# Patient Record
Sex: MALE | Race: WHITE
[De-identification: names, ages, dates, MRNs, and addresses within clinical notes are randomized per-mention and may not be internally consistent; named-entity substitution may affect disease eponyms.]

---

## 2021-12-06 ENCOUNTER — HOSPITAL ENCOUNTER (INPATIENT)
Dept: HOSPITAL 95 - ER | Age: 56
LOS: 7 days | Discharge: ANOTHER HOSPITAL | DRG: 280 | End: 2021-12-13
Attending: HOSPITALIST | Admitting: HOSPITALIST
Payer: COMMERCIAL

## 2021-12-06 VITALS — WEIGHT: 190.48 LBS | HEIGHT: 75 IN | BODY MASS INDEX: 23.68 KG/M2

## 2021-12-06 DIAGNOSIS — I13.0: Primary | ICD-10-CM

## 2021-12-06 DIAGNOSIS — Z28.21: ICD-10-CM

## 2021-12-06 DIAGNOSIS — J96.01: ICD-10-CM

## 2021-12-06 DIAGNOSIS — F10.20: ICD-10-CM

## 2021-12-06 DIAGNOSIS — I27.20: ICD-10-CM

## 2021-12-06 DIAGNOSIS — K74.60: ICD-10-CM

## 2021-12-06 DIAGNOSIS — R18.8: ICD-10-CM

## 2021-12-06 DIAGNOSIS — I27.82: ICD-10-CM

## 2021-12-06 DIAGNOSIS — E83.42: ICD-10-CM

## 2021-12-06 DIAGNOSIS — I21.4: ICD-10-CM

## 2021-12-06 DIAGNOSIS — E87.6: ICD-10-CM

## 2021-12-06 DIAGNOSIS — Z20.822: ICD-10-CM

## 2021-12-06 DIAGNOSIS — R74.01: ICD-10-CM

## 2021-12-06 DIAGNOSIS — N17.9: ICD-10-CM

## 2021-12-06 DIAGNOSIS — E83.39: ICD-10-CM

## 2021-12-06 DIAGNOSIS — N18.30: ICD-10-CM

## 2021-12-06 DIAGNOSIS — I50.21: ICD-10-CM

## 2021-12-06 LAB
ALBUMIN SERPL BCP-MCNC: 3.2 G/DL (ref 3.4–5)
ALBUMIN/GLOB SERPL: 0.7 {RATIO} (ref 0.8–1.8)
ALT SERPL W P-5'-P-CCNC: 816 U/L (ref 12–78)
ANION GAP SERPL CALCULATED.4IONS-SCNC: 16 MMOL/L (ref 6–16)
BASOPHILS # BLD AUTO: 0.02 K/MM3 (ref 0–0.23)
BASOPHILS NFR BLD AUTO: 0 % (ref 0–2)
BILIRUB SERPL-MCNC: 3.7 MG/DL (ref 0.1–1)
BUN SERPL-MCNC: 41 MG/DL (ref 8–24)
CALCIUM SERPL-MCNC: 8.9 MG/DL (ref 8.5–10.1)
CHLORIDE SERPL-SCNC: 97 MMOL/L (ref 98–108)
CO2 SERPL-SCNC: 17 MMOL/L (ref 21–32)
CREAT SERPL-MCNC: 1.65 MG/DL (ref 0.6–1.2)
DEPRECATED RDW RBC AUTO: 47.3 FL (ref 35.1–46.3)
EOSINOPHIL # BLD AUTO: 0 K/MM3 (ref 0–0.68)
EOSINOPHIL NFR BLD AUTO: 0 % (ref 0–6)
ERYTHROCYTE [DISTWIDTH] IN BLOOD BY AUTOMATED COUNT: 13.6 % (ref 11.7–14.2)
GLOBULIN SER CALC-MCNC: 4.3 G/DL (ref 2.2–4)
GLUCOSE SERPL-MCNC: 158 MG/DL (ref 70–99)
HCT VFR BLD AUTO: 48 % (ref 37–53)
HGB BLD-MCNC: 15.9 G/DL (ref 13.5–17.5)
IMM GRANULOCYTES # BLD AUTO: 0.08 K/MM3 (ref 0–0.1)
IMM GRANULOCYTES NFR BLD AUTO: 1 % (ref 0–1)
LYMPHOCYTES # BLD AUTO: 0.94 K/MM3 (ref 0.84–5.2)
LYMPHOCYTES NFR BLD AUTO: 8 % (ref 21–46)
MCHC RBC AUTO-ENTMCNC: 33.1 G/DL (ref 31.5–36.5)
MCV RBC AUTO: 95 FL (ref 80–100)
MONOCYTES # BLD AUTO: 1.17 K/MM3 (ref 0.16–1.47)
MONOCYTES NFR BLD AUTO: 10 % (ref 4–13)
NEUTROPHILS # BLD AUTO: 9.91 K/MM3 (ref 1.96–9.15)
NEUTROPHILS NFR BLD AUTO: 82 % (ref 41–73)
NRBC # BLD AUTO: 0 K/MM3 (ref 0–0.02)
NRBC BLD AUTO-RTO: 0 /100 WBC (ref 0–0.2)
PLATELET # BLD AUTO: 359 K/MM3 (ref 150–400)
POTASSIUM SERPL-SCNC: 4.9 MMOL/L (ref 3.5–5.5)
PROT SERPL-MCNC: 7.5 G/DL (ref 6.4–8.2)
SODIUM SERPL-SCNC: 130 MMOL/L (ref 136–145)
TROPONIN I SERPL-MCNC: 0.26 NG/ML (ref 0–0.04)

## 2021-12-06 PROCEDURE — C1769 GUIDE WIRE: HCPCS

## 2021-12-06 PROCEDURE — C1894 INTRO/SHEATH, NON-LASER: HCPCS

## 2021-12-06 PROCEDURE — A9270 NON-COVERED ITEM OR SERVICE: HCPCS

## 2021-12-07 LAB
ALBUMIN SERPL BCP-MCNC: 3.2 G/DL (ref 3.4–5)
ALBUMIN/GLOB SERPL: 0.8 {RATIO} (ref 0.8–1.8)
ALT SERPL W P-5'-P-CCNC: 1245 U/L (ref 12–78)
AMPHETAMINES UR SCN-MCNC: DETECTED NG/ML
AMPHETAMINES UR-MCNC: DETECTED UG/L
ANION GAP SERPL CALCULATED.4IONS-SCNC: 13 MMOL/L (ref 6–16)
AST SERPL W P-5'-P-CCNC: 1712 U/L (ref 12–37)
BASOPHILS # BLD AUTO: 0.02 K/MM3 (ref 0–0.23)
BASOPHILS NFR BLD AUTO: 0 % (ref 0–2)
BILIRUB SERPL-MCNC: 3.3 MG/DL (ref 0.1–1)
BUN SERPL-MCNC: 43 MG/DL (ref 8–24)
CALCIUM SERPL-MCNC: 9.1 MG/DL (ref 8.5–10.1)
CHLORIDE SERPL-SCNC: 98 MMOL/L (ref 98–108)
CK MB CFR SERPL CALC: 1.6 % (ref 0–4)
CK MB CFR SERPL CALC: 1.9 % (ref 0–4)
CK SERPL-CCNC: 322 U/L (ref 39–308)
CK SERPL-CCNC: 402 U/L (ref 39–308)
CO2 SERPL-SCNC: 20 MMOL/L (ref 21–32)
CREAT SERPL-MCNC: 1.55 MG/DL (ref 0.6–1.2)
DEPRECATED RDW RBC AUTO: 44.7 FL (ref 35.1–46.3)
EOSINOPHIL # BLD AUTO: 0 K/MM3 (ref 0–0.68)
EOSINOPHIL NFR BLD AUTO: 0 % (ref 0–6)
ERYTHROCYTE [DISTWIDTH] IN BLOOD BY AUTOMATED COUNT: 13.4 % (ref 11.7–14.2)
FLUAV RNA SPEC QL NAA+PROBE: NEGATIVE
FLUBV RNA SPEC QL NAA+PROBE: NEGATIVE
GLOBULIN SER CALC-MCNC: 4.1 G/DL (ref 2.2–4)
GLUCOSE SERPL-MCNC: 167 MG/DL (ref 70–99)
HCT VFR BLD AUTO: 46 % (ref 37–53)
HGB BLD-MCNC: 15.7 G/DL (ref 13.5–17.5)
IMM GRANULOCYTES # BLD AUTO: 0.08 K/MM3 (ref 0–0.1)
IMM GRANULOCYTES NFR BLD AUTO: 1 % (ref 0–1)
LYMPHOCYTES # BLD AUTO: 0.91 K/MM3 (ref 0.84–5.2)
LYMPHOCYTES NFR BLD AUTO: 7 % (ref 21–46)
MCHC RBC AUTO-ENTMCNC: 34.1 G/DL (ref 31.5–36.5)
MCV RBC AUTO: 92 FL (ref 80–100)
MONOCYTES # BLD AUTO: 0.87 K/MM3 (ref 0.16–1.47)
MONOCYTES NFR BLD AUTO: 7 % (ref 4–13)
NEUTROPHILS # BLD AUTO: 11.13 K/MM3 (ref 1.96–9.15)
NEUTROPHILS NFR BLD AUTO: 86 % (ref 41–73)
NRBC # BLD AUTO: 0.02 K/MM3 (ref 0–0.02)
NRBC BLD AUTO-RTO: 0.2 /100 WBC (ref 0–0.2)
PLATELET # BLD AUTO: 366 K/MM3 (ref 150–400)
POTASSIUM SERPL-SCNC: 5 MMOL/L (ref 3.5–5.5)
PROT SERPL-MCNC: 7.3 G/DL (ref 6.4–8.2)
RSV RNA SPEC QL NAA+PROBE: NEGATIVE
SARS-COV-2 RNA RESP QL NAA+PROBE: NEGATIVE
SODIUM SERPL-SCNC: 131 MMOL/L (ref 136–145)
TROPONIN I SERPL-MCNC: 0.22 NG/ML (ref 0–0.04)
TROPONIN I SERPL-MCNC: 0.23 NG/ML (ref 0–0.04)

## 2021-12-07 NOTE — NUR
ADMIT NOTE
RECEIVED REPORT FROM MICHAEL OSEI IN ED. PT TO ROOM AT 1444 VIA GURNEY, 1 PERSON
ASSIST TRANSFERED TO BED. PT ORIENTED TO ROOM AND CALL LIGHT. EDCUATED ON FALL
RISK AND BED ALARM. PT ALERT, BUT FALLS ASLEEP QUICKLY; ORIENTED x4; CALM AND
COOPERATIVE WITH CARE. PT DENIES RECREATIONAL DRUG USE IN THE LAST 12 MONTHS.
PT DENIES PAIN, CHEST PAIN, SOB, NAUSEA AND DIZZINESS AT THIS TIME. PT STATES
PRIOR TO BEING ADMITTED HIS SOB HAS BEEN WORSENING OVER THE LAST 2-3 WEEKS. PT
RECEIVING IV LASIX AND STARTED ON METOPROLOL. PT ON HEPARIN GTT. DR LAST AT
BEDSIDE THIS EVENING TO SEE PATIENT. VSS. NO OTHER ACUTE CHANGES NOTED. WILL
CONTINUE TO MONITOR UNITL REPORT GIVEN TO ONCOMING RN.

## 2021-12-08 LAB
ALBUMIN SERPL BCP-MCNC: 2.8 G/DL (ref 3.4–5)
ANION GAP SERPL CALCULATED.4IONS-SCNC: 11 MMOL/L (ref 6–16)
BASOPHILS # BLD AUTO: 0.02 K/MM3 (ref 0–0.23)
BASOPHILS NFR BLD AUTO: 0 % (ref 0–2)
BUN SERPL-MCNC: 39 MG/DL (ref 8–24)
CALCIUM SERPL-MCNC: 8.5 MG/DL (ref 8.5–10.1)
CHLORIDE SERPL-SCNC: 95 MMOL/L (ref 98–108)
CO2 SERPL-SCNC: 27 MMOL/L (ref 21–32)
CREAT SERPL-MCNC: 1.41 MG/DL (ref 0.6–1.2)
DEPRECATED RDW RBC AUTO: 44 FL (ref 35.1–46.3)
EOSINOPHIL # BLD AUTO: 0.02 K/MM3 (ref 0–0.68)
EOSINOPHIL NFR BLD AUTO: 0 % (ref 0–6)
ERYTHROCYTE [DISTWIDTH] IN BLOOD BY AUTOMATED COUNT: 13.6 % (ref 11.7–14.2)
GLUCOSE SERPL-MCNC: 128 MG/DL (ref 70–99)
HCT VFR BLD AUTO: 44 % (ref 37–53)
HGB BLD-MCNC: 15.2 G/DL (ref 13.5–17.5)
IMM GRANULOCYTES # BLD AUTO: 0.08 K/MM3 (ref 0–0.1)
IMM GRANULOCYTES NFR BLD AUTO: 1 % (ref 0–1)
LYMPHOCYTES # BLD AUTO: 1.26 K/MM3 (ref 0.84–5.2)
LYMPHOCYTES NFR BLD AUTO: 9 % (ref 21–46)
MAGNESIUM SERPL-MCNC: 1.9 MG/DL (ref 1.6–2.4)
MCHC RBC AUTO-ENTMCNC: 34.5 G/DL (ref 31.5–36.5)
MCV RBC AUTO: 92 FL (ref 80–100)
MONOCYTES # BLD AUTO: 0.88 K/MM3 (ref 0.16–1.47)
MONOCYTES NFR BLD AUTO: 6 % (ref 4–13)
NEUTROPHILS # BLD AUTO: 11.63 K/MM3 (ref 1.96–9.15)
NEUTROPHILS NFR BLD AUTO: 84 % (ref 41–73)
NRBC # BLD AUTO: 0.06 K/MM3 (ref 0–0.02)
NRBC BLD AUTO-RTO: 0.4 /100 WBC (ref 0–0.2)
PHOSPHATE SERPL-MCNC: 3.1 MG/DL (ref 2.5–4.9)
PLATELET # BLD AUTO: 341 K/MM3 (ref 150–400)
POTASSIUM SERPL-SCNC: 3.5 MMOL/L (ref 3.5–5.5)
SODIUM SERPL-SCNC: 133 MMOL/L (ref 136–145)

## 2021-12-08 NOTE — NUR
SHIFT SUMMARY
PT A&Ox4; CALM AND COOPERATIVE WITH CARE. PT RESTING IN BED DURING SHIFT. PT
DENIES PAIN, CHEST PAIN, SOB, NASUEA AND DIZZINESS T/O SHIFT. PT RECEIVING IV
LASIX. ELEVATED BP THIS AM, INCREASED DOSE OF METORPOLOL PER ORDERS, BP
TRENDING DOWN. OTHER VSS. PLANS FOR NPO AT [facility] FOR POSSIBLE ANGIO TOMORROW. NO
OTHER ACUTE CHANGES NOTED. WILL CONTINUE TO MONITOR UNTIL REPORT GIVEN TO
ONCOMING RN.

## 2021-12-08 NOTE — NUR
LEFT AC IV INFUSING HEPARIN PER ORDERS AT 18UNITS/KG/HR, 33.8ML/HR. PER REPORT
HEPARIN DOSE HAS NOT CHANGED SINCE ADMITTANCE.

## 2021-12-08 NOTE — NUR
SHIFT SUMMARY
 
PT A&OX4, PLEASANT AND COOPERATIVE. SP02>92% ON 2L NC. PT DENIES SOB.
TELEMETYR READS SINUS TACH, 100'S. DENIED CP/PRESSURE. PT USED URINAL TO VOID
DARK YELLOW URINE.  NO BM THIS SHIFT. HEPARIN INFUSED THIS SHIFT. PT SLEPT
MOST OF NIGHT. PT HAS BEEN NPO SINCE MIDNIGHT FOR POSSIBLE PROCEDURE. CALL
LIGHT IN REACH. WILL GIVE REPORT TO ONCOMING NURSE.

## 2021-12-09 LAB
ALBUMIN SERPL BCP-MCNC: 2.6 G/DL (ref 3.4–5)
ANION GAP SERPL CALCULATED.4IONS-SCNC: 11 MMOL/L (ref 6–16)
BASOPHILS # BLD AUTO: 0.01 K/MM3 (ref 0–0.23)
BASOPHILS NFR BLD AUTO: 0 % (ref 0–2)
BUN SERPL-MCNC: 33 MG/DL (ref 8–24)
CALCIUM SERPL-MCNC: 8.1 MG/DL (ref 8.5–10.1)
CHLORIDE SERPL-SCNC: 92 MMOL/L (ref 98–108)
CO2 SERPL-SCNC: 30 MMOL/L (ref 21–32)
CREAT SERPL-MCNC: 1.29 MG/DL (ref 0.6–1.2)
DEPRECATED RDW RBC AUTO: 45.1 FL (ref 35.1–46.3)
EOSINOPHIL # BLD AUTO: 0.04 K/MM3 (ref 0–0.68)
EOSINOPHIL NFR BLD AUTO: 0 % (ref 0–6)
ERYTHROCYTE [DISTWIDTH] IN BLOOD BY AUTOMATED COUNT: 13.7 % (ref 11.7–14.2)
GLUCOSE SERPL-MCNC: 139 MG/DL (ref 70–99)
HCT VFR BLD AUTO: 45.8 % (ref 37–53)
HGB BLD-MCNC: 15.7 G/DL (ref 13.5–17.5)
IMM GRANULOCYTES # BLD AUTO: 0.05 K/MM3 (ref 0–0.1)
IMM GRANULOCYTES NFR BLD AUTO: 0 % (ref 0–1)
LYMPHOCYTES # BLD AUTO: 1.35 K/MM3 (ref 0.84–5.2)
LYMPHOCYTES NFR BLD AUTO: 11 % (ref 21–46)
MAGNESIUM SERPL-MCNC: 1.7 MG/DL (ref 1.6–2.4)
MCHC RBC AUTO-ENTMCNC: 34.3 G/DL (ref 31.5–36.5)
MCV RBC AUTO: 92 FL (ref 80–100)
MONOCYTES # BLD AUTO: 0.98 K/MM3 (ref 0.16–1.47)
MONOCYTES NFR BLD AUTO: 8 % (ref 4–13)
NEUTROPHILS # BLD AUTO: 9.64 K/MM3 (ref 1.96–9.15)
NEUTROPHILS NFR BLD AUTO: 80 % (ref 41–73)
NRBC # BLD AUTO: 0.02 K/MM3 (ref 0–0.02)
NRBC BLD AUTO-RTO: 0.2 /100 WBC (ref 0–0.2)
PHOSPHATE SERPL-MCNC: 2.3 MG/DL (ref 2.5–4.9)
PLATELET # BLD AUTO: 321 K/MM3 (ref 150–400)
POTASSIUM SERPL-SCNC: 3.3 MMOL/L (ref 3.5–5.5)
SODIUM SERPL-SCNC: 133 MMOL/L (ref 136–145)

## 2021-12-09 NOTE — NUR
CARE NOTE
PT WAS REPORTING FEELING OF DISTENTION IN ABD AND STATED THAT "STOMACH FELT
UPSET," PT ALSO REPORTED FEELINGS OF NAUSEA. VSS, MEDICATED PER EMAR FOR
NAUSEA. PT NOW APPEARS TO BE SLEEPING. WILL CONTINUE TO MONITOR.

## 2021-12-09 NOTE — NUR
SHIFT SUMMARY
PT ALERT AND ORIENTED X 4. HEPARIN INFUSING AT 18UNITS/KG/HR PER ORDERS. SPO2
REMAINS IN 90'S VIA 1LNC, VITAL SIGNS STABLE. PT DENIED CHEST PAIN/PRESSURE
DURING SHIFT BUT REPORTED ABD PAIN AND NAUSEA. NAUSEA WAS RESOLVED AFTER
ZOFRAN ADMINISTRATION PER EMAR AND PT REPORTS BELIEVING ABD PAIN IS DUE TO
CONSTIPATION, WILL REPORT THIS TO ONCOMING NURSE FOR REQUEST FOR BOWEL CARE
ORDERS. PT HAS BEEN NPO SINCE MIDNIGHT AND CONTINUES TO UTILIZE BEDSIDE
URINAL. CALL LIGHT IN REACH, WILL CONTINUE TO MONITOR.

## 2021-12-09 NOTE — NUR
PT REMAINS A&OX4. AM BP ELEVATED- SCHEDULED BP MEDS ADMINISTERED, REMAINED WNL
SINCE ADMINISTRATION. AFEBRILE. NO C/O PAIN. AUO. NO BM- C/O CONSTIPATION,
ENCOURAGED FREQUENT AMBULATION. TOLERATING CURRENT DIET. HEPARIN GTT DC'D,
TRANSITION TO LOVENOX TONIGHT- NO S/S OF BLEEDING NOTED AT THIS TIME. ANGIO
HELD PER CARDIO- NO RESCHEDULE DATE PROVIDED. BLE VENOUS DUPLEX COMPLETED- SEE
RESULTS. FREQUENT ROUNDS TO ENSURE PT SAFETY. PT IN NO APPARENT DISTRESS AT
THIS TIME. WILL CONTINUE TO MONITOR UNTIL TRANSFER OF CARE TO ONCOMING RN.

## 2021-12-10 LAB
ALBUMIN SERPL BCP-MCNC: 2.7 G/DL (ref 3.4–5)
ANION GAP SERPL CALCULATED.4IONS-SCNC: 10 MMOL/L (ref 6–16)
BUN SERPL-MCNC: 29 MG/DL (ref 8–24)
CALCIUM SERPL-MCNC: 8.4 MG/DL (ref 8.5–10.1)
CHLORIDE SERPL-SCNC: 90 MMOL/L (ref 98–108)
CO2 SERPL-SCNC: 32 MMOL/L (ref 21–32)
CREAT SERPL-MCNC: 1.42 MG/DL (ref 0.6–1.2)
GLUCOSE SERPL-MCNC: 133 MG/DL (ref 70–99)
HEP C VIRUS AB: <0.1 (ref 0–0.9)
MAGNESIUM SERPL-MCNC: 1.9 MG/DL (ref 1.6–2.4)
PHOSPHATE SERPL-MCNC: 2.9 MG/DL (ref 2.5–4.9)
POTASSIUM SERPL-SCNC: 3.1 MMOL/L (ref 3.5–5.5)
SODIUM SERPL-SCNC: 132 MMOL/L (ref 136–145)

## 2021-12-10 NOTE — NUR
PT REMAINS A&OX4. AM BP ELEVATED- SCHEDULED BP MEDS ADMINISTERED, BP REMAINED
WNL SINCE ADMINISTRATION. AUO. NO BM- C/O CONSTIPATION, FREQUENT AMBULATION
AND ORAL INTAKE ENCOURAGE. TOLERATING CURRENT DIET.
 
TENTATIVE ANGIO SCHEDULED TOMORROW (12/11), WILL NEED TO BE NPO AT 00:00 PER
CARDIO. K REPLACED.
 
FREQUENT ROUNDS TO ENSURE PT SAFETY. PT IN NO APPARENT DISTRESS AT THIS TIME.
WILL CONTINUE TO MONITOR UNTIL TRANSFER OF CARE TO ONCOMING RN.

## 2021-12-10 NOTE — NUR
EVENT NOTE
PT REQUESTED RN TO ROOM WITH COMPLAINT OF ITCHING AND TINGLING TO RIGHT HAND.
IV SITE IS PATENT AND RUNNING LEVAQUIN AND LR TO R HAND. NO SWELLING OR
REDNESS BUT PT IS VISIBLY SCRATCHING AND WIGGLING FINGERS. PLACED LEVAQUIN ON
STAND-BY SINCE THIS IS A NEW MEDICATION AND NOTIFIED PRIMARY RN SAROJ OF NEED
TO NOTIFY MD. ADVISED PT TO CALL AGAIN IF FURTHER CONCERNS OR CHANGES.

## 2021-12-10 NOTE — NUR
PT HAS BEEN SLEEPING THROUGHOUT SHIFT. BP SLIGHTLY ELEVATED BUT NOT WITHIN
PARAMETERS FOR PRN HYDRALAZINE. DIURESING. DENIES SOB OR CHEST PAIN. EDUCATED
PATIENT ON DIAGNOSIS AND TREATMENT PLAN. CONTINUE EDUCATION AS DISEASE
PROCESSES ARE NEW TO PATIENT.

## 2021-12-11 LAB
ALBUMIN SERPL BCP-MCNC: 2.6 G/DL (ref 3.4–5)
ANION GAP SERPL CALCULATED.4IONS-SCNC: 10 MMOL/L (ref 6–16)
BUN SERPL-MCNC: 28 MG/DL (ref 8–24)
CALCIUM SERPL-MCNC: 8.8 MG/DL (ref 8.5–10.1)
CHLORIDE SERPL-SCNC: 88 MMOL/L (ref 98–108)
CO2 SERPL-SCNC: 32 MMOL/L (ref 21–32)
CREAT SERPL-MCNC: 1.45 MG/DL (ref 0.6–1.2)
GLUCOSE SERPL-MCNC: 139 MG/DL (ref 70–99)
MAGNESIUM SERPL-MCNC: 1.9 MG/DL (ref 1.6–2.4)
PHOSPHATE SERPL-MCNC: 2.8 MG/DL (ref 2.5–4.9)
POTASSIUM SERPL-SCNC: 3.6 MMOL/L (ref 3.5–5.5)
SODIUM SERPL-SCNC: 130 MMOL/L (ref 136–145)

## 2021-12-11 PROCEDURE — 4A023N7 MEASUREMENT OF CARDIAC SAMPLING AND PRESSURE, LEFT HEART, PERCUTANEOUS APPROACH: ICD-10-PCS | Performed by: INTERNAL MEDICINE

## 2021-12-11 PROCEDURE — B2111ZZ FLUOROSCOPY OF MULTIPLE CORONARY ARTERIES USING LOW OSMOLAR CONTRAST: ICD-10-PCS | Performed by: INTERNAL MEDICINE

## 2021-12-11 NOTE — NUR
PT A&OX4. VSS. NO C/O PAIN. AUO. TOLERATING CURRENT DIET.
 
ANGIO COMPLETE- PENDING PROVIDENCE EVAL PER CARDIO. IVF STARTED.
 
FREQUENT ROUNDS TO ENSURE PT SAFETY. PT IN NO APPARENT DISTRESS AT THIS TIME.
WILL CONTINUE TO MONITOR UNTIL TRANSFER OF CARE TO ONCOMING RN.

## 2021-12-11 NOTE — NUR
PT REPORTING CONSITPATION AT STRT OF SHIFT. ADMINISERED STOOL SOFTNER, PT
TAKES AHOWER AND IS ABLE TO HAVE BM. CONTINUE BOWEL REGIMEN.

## 2021-12-12 LAB
ALBUMIN SERPL BCP-MCNC: 2.4 G/DL (ref 3.4–5)
ALBUMIN/GLOB SERPL: 0.7 {RATIO} (ref 0.8–1.8)
ALT SERPL W P-5'-P-CCNC: 270 U/L (ref 12–78)
ANION GAP SERPL CALCULATED.4IONS-SCNC: 9 MMOL/L (ref 6–16)
AST SERPL W P-5'-P-CCNC: 95 U/L (ref 12–37)
BILIRUB SERPL-MCNC: 1.3 MG/DL (ref 0.1–1)
BUN SERPL-MCNC: 49 MG/DL (ref 8–24)
CALCIUM SERPL-MCNC: 8.2 MG/DL (ref 8.5–10.1)
CHLORIDE SERPL-SCNC: 97 MMOL/L (ref 98–108)
CO2 SERPL-SCNC: 26 MMOL/L (ref 21–32)
CREAT SERPL-MCNC: 1.2 MG/DL (ref 0.6–1.2)
GLOBULIN SER CALC-MCNC: 3.6 G/DL (ref 2.2–4)
GLUCOSE SERPL-MCNC: 115 MG/DL (ref 70–99)
POTASSIUM SERPL-SCNC: 5 MMOL/L (ref 3.5–5.5)
PROT SERPL-MCNC: 6 G/DL (ref 6.4–8.2)
SODIUM SERPL-SCNC: 132 MMOL/L (ref 136–145)

## 2021-12-12 NOTE — NUR
NO SIGNIFICANT EVENTS THIS SHIFT. PT RECIEVED 1L NS, URINE IS [place] COLORED.
VSS, NO ABNORMAL CARDIAC RHYTHMS THIS SHIFT. PLACED RIGHT WRIST IN SPLINT AS
PATIENT WAS OVER USING IT RESULTING IN SLIGHT OOZING FROM RADIAL SITE.
STABALIZED WITH SPLINT PLACEMENT.

## 2021-12-12 NOTE — NUR
END OF SHIFT SUMMARY:
     PATIENT HAS BEEN AWAITING [facility]BRA TRANSFER TO [facility] FOR
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 PATIENT
HAS BEEN ALERT AND ORIENTED, RESTING MOST OF THE DAY. I HELD HIS 1400
HYDRALAZINE, DUE TO LOWER BLOOD PRESSURE OF 90'S OVER 60'S AFTER TYPICALLTY
BEING IN 'S. NO PRN HYDRALAZINE USED THIS SHIFT, PATIENT DENIES CHEST
PAIN, RESTING MOST OF THE DAY. AWAITING [place] FOR BED PLACEMENT.

## 2021-12-13 LAB
ALBUMIN SERPL BCP-MCNC: 2.3 G/DL (ref 3.4–5)
ALBUMIN/GLOB SERPL: 0.6 {RATIO} (ref 0.8–1.8)
ALT SERPL W P-5'-P-CCNC: 196 U/L (ref 12–78)
ANION GAP SERPL CALCULATED.4IONS-SCNC: 9 MMOL/L (ref 6–16)
AST SERPL W P-5'-P-CCNC: 60 U/L (ref 12–37)
BILIRUB SERPL-MCNC: 1.1 MG/DL (ref 0.1–1)
BUN SERPL-MCNC: 59 MG/DL (ref 8–24)
CALCIUM SERPL-MCNC: 8.1 MG/DL (ref 8.5–10.1)
CHLORIDE SERPL-SCNC: 97 MMOL/L (ref 98–108)
CO2 SERPL-SCNC: 25 MMOL/L (ref 21–32)
CREAT SERPL-MCNC: 1.18 MG/DL (ref 0.6–1.2)
FLUAV RNA SPEC QL NAA+PROBE: NEGATIVE
FLUBV RNA SPEC QL NAA+PROBE: NEGATIVE
GLOBULIN SER CALC-MCNC: 3.8 G/DL (ref 2.2–4)
GLUCOSE SERPL-MCNC: 130 MG/DL (ref 70–99)
POTASSIUM SERPL-SCNC: 4.5 MMOL/L (ref 3.5–5.5)
PROT SERPL-MCNC: 6.1 G/DL (ref 6.4–8.2)
RSV RNA SPEC QL NAA+PROBE: NEGATIVE
SARS-COV-2 RNA RESP QL NAA+PROBE: NEGATIVE
SODIUM SERPL-SCNC: 131 MMOL/L (ref 136–145)

## 2021-12-13 NOTE — NUR
NIGHT SHIFT SUMMARY
PT IS AXO X4. PT DENIED ANY CP OR PRESSURE THIS SHIFT. PT MAINTAINED O2 SATS
>92% ON EM AIR THIS SHIFT. BP WNL AND STABLE THIS SHIFT. PT SLEPT COMFORTABLY
IN BED FOR MOST OF THE SHIFT. PT NPO SINCE 2330 PER ORDER. WILL REPORT TO
ONCOMING RN.

## 2022-01-27 ENCOUNTER — HOSPITAL ENCOUNTER (INPATIENT)
Dept: HOSPITAL 95 - ER | Age: 57
LOS: 10 days | Discharge: HOME | DRG: 291 | End: 2022-02-06
Attending: FAMILY MEDICINE | Admitting: HOSPITALIST
Payer: COMMERCIAL

## 2022-01-27 VITALS — HEIGHT: 75 IN | BODY MASS INDEX: 26.18 KG/M2 | WEIGHT: 210.54 LBS

## 2022-01-27 DIAGNOSIS — E87.1: ICD-10-CM

## 2022-01-27 DIAGNOSIS — Z91.14: ICD-10-CM

## 2022-01-27 DIAGNOSIS — I48.19: ICD-10-CM

## 2022-01-27 DIAGNOSIS — F15.10: ICD-10-CM

## 2022-01-27 DIAGNOSIS — N18.30: ICD-10-CM

## 2022-01-27 DIAGNOSIS — J96.21: ICD-10-CM

## 2022-01-27 DIAGNOSIS — K74.60: ICD-10-CM

## 2022-01-27 DIAGNOSIS — Z20.822: ICD-10-CM

## 2022-01-27 DIAGNOSIS — I25.2: ICD-10-CM

## 2022-01-27 DIAGNOSIS — Z86.711: ICD-10-CM

## 2022-01-27 DIAGNOSIS — G47.00: ICD-10-CM

## 2022-01-27 DIAGNOSIS — Z87.891: ICD-10-CM

## 2022-01-27 DIAGNOSIS — I13.0: Primary | ICD-10-CM

## 2022-01-27 DIAGNOSIS — Z79.899: ICD-10-CM

## 2022-01-27 DIAGNOSIS — I48.92: ICD-10-CM

## 2022-01-27 DIAGNOSIS — I50.23: ICD-10-CM

## 2022-01-27 DIAGNOSIS — I42.7: ICD-10-CM

## 2022-01-27 DIAGNOSIS — N17.9: ICD-10-CM

## 2022-01-27 LAB
ALBUMIN SERPL BCP-MCNC: 2.8 G/DL (ref 3.4–5)
ALBUMIN/GLOB SERPL: 0.6 {RATIO} (ref 0.8–1.8)
ALT SERPL W P-5'-P-CCNC: 82 U/L (ref 12–78)
AMPHETAMINES UR-MCNC: DETECTED UG/L
ANION GAP SERPL CALCULATED.4IONS-SCNC: 7 MMOL/L (ref 6–16)
ANION GAP SERPL CALCULATED.4IONS-SCNC: 8 MMOL/L (ref 6–16)
AST SERPL W P-5'-P-CCNC: 41 U/L (ref 12–37)
BASOPHILS # BLD AUTO: 0.05 K/MM3 (ref 0–0.23)
BASOPHILS NFR BLD AUTO: 1 % (ref 0–2)
BILIRUB SERPL-MCNC: 0.7 MG/DL (ref 0.1–1)
BUN SERPL-MCNC: 26 MG/DL (ref 8–24)
BUN SERPL-MCNC: 26 MG/DL (ref 8–24)
CALCIUM SERPL-MCNC: 8.2 MG/DL (ref 8.5–10.1)
CALCIUM SERPL-MCNC: 8.3 MG/DL (ref 8.5–10.1)
CANNABINOIDS UR QL: DETECTED
CHLORIDE SERPL-SCNC: 102 MMOL/L (ref 98–108)
CHLORIDE SERPL-SCNC: 104 MMOL/L (ref 98–108)
CO2 SERPL-SCNC: 25 MMOL/L (ref 21–32)
CO2 SERPL-SCNC: 26 MMOL/L (ref 21–32)
CREAT SERPL-MCNC: 1.47 MG/DL (ref 0.6–1.2)
CREAT SERPL-MCNC: 1.52 MG/DL (ref 0.6–1.2)
CRP SERPL HS-MCNC: 60.2 MG/L (ref 0–3)
DEPRECATED RDW RBC AUTO: 58.6 FL (ref 35.1–46.3)
EOSINOPHIL # BLD AUTO: 0.1 K/MM3 (ref 0–0.68)
EOSINOPHIL NFR BLD AUTO: 1 % (ref 0–6)
ERYTHROCYTE [DISTWIDTH] IN BLOOD BY AUTOMATED COUNT: 17.8 % (ref 11.7–14.2)
ETHANOL SERPL-MCNC: 34 MG/DL
FLUAV RNA SPEC QL NAA+PROBE: NEGATIVE
FLUBV RNA SPEC QL NAA+PROBE: NEGATIVE
GLOBULIN SER CALC-MCNC: 4.8 G/DL (ref 2.2–4)
GLUCOSE SERPL-MCNC: 117 MG/DL (ref 70–99)
GLUCOSE SERPL-MCNC: 98 MG/DL (ref 70–99)
HCT VFR BLD AUTO: 36.9 % (ref 37–53)
HGB BLD-MCNC: 11.4 G/DL (ref 13.5–17.5)
IMM GRANULOCYTES # BLD AUTO: 0.03 K/MM3 (ref 0–0.1)
IMM GRANULOCYTES NFR BLD AUTO: 0 % (ref 0–1)
LYMPHOCYTES # BLD AUTO: 1.45 K/MM3 (ref 0.84–5.2)
LYMPHOCYTES NFR BLD AUTO: 15 % (ref 21–46)
MAGNESIUM SERPL-MCNC: 2 MG/DL (ref 1.6–2.4)
MCHC RBC AUTO-ENTMCNC: 30.9 G/DL (ref 31.5–36.5)
MCV RBC AUTO: 89 FL (ref 80–100)
MONOCYTES # BLD AUTO: 0.88 K/MM3 (ref 0.16–1.47)
MONOCYTES NFR BLD AUTO: 9 % (ref 4–13)
NEUTROPHILS # BLD AUTO: 7.06 K/MM3 (ref 1.96–9.15)
NEUTROPHILS NFR BLD AUTO: 74 % (ref 41–73)
NRBC # BLD AUTO: 0 K/MM3 (ref 0–0.02)
NRBC BLD AUTO-RTO: 0 /100 WBC (ref 0–0.2)
PLATELET # BLD AUTO: 463 K/MM3 (ref 150–400)
POTASSIUM SERPL-SCNC: 3.5 MMOL/L (ref 3.5–5.5)
POTASSIUM SERPL-SCNC: 3.5 MMOL/L (ref 3.5–5.5)
PROT SERPL-MCNC: 7.6 G/DL (ref 6.4–8.2)
PROT UR STRIP-MCNC: (no result) MG/DL
RBC #/AREA URNS HPF: (no result) /HPF (ref 0–2)
RSV RNA SPEC QL NAA+PROBE: NEGATIVE
SARS-COV-2 RNA RESP QL NAA+PROBE: NEGATIVE
SODIUM SERPL-SCNC: 135 MMOL/L (ref 136–145)
SODIUM SERPL-SCNC: 137 MMOL/L (ref 136–145)
SP GR SPEC: 1.02 (ref 1–1.02)
TROPONIN I SERPL-MCNC: 0.66 NG/ML (ref 0–0.04)
UROBILINOGEN UR STRIP-MCNC: (no result) MG/DL

## 2022-01-27 PROCEDURE — A9270 NON-COVERED ITEM OR SERVICE: HCPCS

## 2022-01-27 PROCEDURE — G0008 ADMIN INFLUENZA VIRUS VAC: HCPCS

## 2022-01-27 PROCEDURE — G0480 DRUG TEST DEF 1-7 CLASSES: HCPCS

## 2022-01-27 NOTE — NUR
Patient is stting up in bed and alert. Pt tells me about his medical issues,
his concerns, his family unit and his Jainism Gnosticism cristi. Patient
explains that he has been a part of the [employer] most of his life and is still
active in the [employer] currently. Pt says that his cristi is a source of strength
for him. 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
he needs to be well soon. I normalize his experience and provide therapeutic
listening and prayer. Patient responds well and shows signs of being
encouraged in his cristi. I will continue to remain available to patient and
family.

## 2022-01-27 NOTE — NUR
PT ARRIVAL
PT ARRIVED TO UNIT AT 0610. PT IS A/OX4. TELE SHOWS SINUS TACH @ 140. LABORED
BREATHING BUT DENIES SOB. VITAL SIGNS STABLE. AWAITING TO GIVE REPORT TO
OPAL RODRIGUEZ.
 
CALL LIGHT IN REACH, BED IN LOWEST POSITION.

## 2022-01-27 NOTE — NUR
SHIFT SUMMARY
 
PT A&O X4. VSS. SPO2 > 92% ON RA. MONITOR SHOWING AFIB/AFLUTTER, HR
120's-140's W/ CARDIZEM GTT INFUSING UNTIL MD ISTRATE W/ NEW ORDER TO
TRANSITION TO PO CARDIZEM ONCE PT HR SUCCESSFULLY SUSTAINING HR 90's-110. PT
RECIEVED PO CARIDIZEM X1 & CARDIZEM GTT DC'd AFTER PER MD INSTRUCTION.
PO CARDIZEM THEN DC'd BY MD PER PHARMACY RECOMMENDATION & TRANSITIONED TO
SCHEDULED PO METOPROLOL & PRN IV METOPROLOL FOR HR > 110 BPM. MONITOR NOW
SHOWING AFLUTTER, HR 70's-100. PT W/ BRUCE CATH PATENT & DRAINING W/
SIGNIFICANT OUTPUT TODAY, SEE I/O's. NO EVENTS TODAY.

## 2022-01-28 LAB
ALBUMIN SERPL BCP-MCNC: 2.5 G/DL (ref 3.4–5)
ALBUMIN/GLOB SERPL: 0.6 {RATIO} (ref 0.8–1.8)
ALT SERPL W P-5'-P-CCNC: 70 U/L (ref 12–78)
ANION GAP SERPL CALCULATED.4IONS-SCNC: 9 MMOL/L (ref 6–16)
AST SERPL W P-5'-P-CCNC: 31 U/L (ref 12–37)
BASOPHILS # BLD AUTO: 0.05 K/MM3 (ref 0–0.23)
BASOPHILS NFR BLD AUTO: 1 % (ref 0–2)
BILIRUB SERPL-MCNC: 0.6 MG/DL (ref 0.1–1)
BUN SERPL-MCNC: 27 MG/DL (ref 8–24)
CALCIUM SERPL-MCNC: 8 MG/DL (ref 8.5–10.1)
CHLORIDE SERPL-SCNC: 95 MMOL/L (ref 98–108)
CO2 SERPL-SCNC: 34 MMOL/L (ref 21–32)
CREAT SERPL-MCNC: 1.6 MG/DL (ref 0.6–1.2)
DEPRECATED RDW RBC AUTO: 57.8 FL (ref 35.1–46.3)
EOSINOPHIL # BLD AUTO: 0.16 K/MM3 (ref 0–0.68)
EOSINOPHIL NFR BLD AUTO: 2 % (ref 0–6)
ERYTHROCYTE [DISTWIDTH] IN BLOOD BY AUTOMATED COUNT: 17.7 % (ref 11.7–14.2)
GLOBULIN SER CALC-MCNC: 4 G/DL (ref 2.2–4)
GLUCOSE SERPL-MCNC: 161 MG/DL (ref 70–99)
HCT VFR BLD AUTO: 35.4 % (ref 37–53)
HGB BLD-MCNC: 11.1 G/DL (ref 13.5–17.5)
IMM GRANULOCYTES # BLD AUTO: 0.03 K/MM3 (ref 0–0.1)
IMM GRANULOCYTES NFR BLD AUTO: 0 % (ref 0–1)
LYMPHOCYTES # BLD AUTO: 1.31 K/MM3 (ref 0.84–5.2)
LYMPHOCYTES NFR BLD AUTO: 14 % (ref 21–46)
MAGNESIUM SERPL-MCNC: 1.7 MG/DL (ref 1.6–2.4)
MCHC RBC AUTO-ENTMCNC: 31.4 G/DL (ref 31.5–36.5)
MCV RBC AUTO: 88 FL (ref 80–100)
MONOCYTES # BLD AUTO: 0.75 K/MM3 (ref 0.16–1.47)
MONOCYTES NFR BLD AUTO: 8 % (ref 4–13)
NEUTROPHILS # BLD AUTO: 6.84 K/MM3 (ref 1.96–9.15)
NEUTROPHILS NFR BLD AUTO: 75 % (ref 41–73)
NRBC # BLD AUTO: 0 K/MM3 (ref 0–0.02)
NRBC BLD AUTO-RTO: 0 /100 WBC (ref 0–0.2)
PLATELET # BLD AUTO: 483 K/MM3 (ref 150–400)
POTASSIUM SERPL-SCNC: 3.6 MMOL/L (ref 3.5–5.5)
PROT SERPL-MCNC: 6.5 G/DL (ref 6.4–8.2)
SODIUM SERPL-SCNC: 138 MMOL/L (ref 136–145)
TSH SERPL DL<=0.005 MIU/L-ACNC: 2.12 UIU/ML (ref 0.36–4.8)

## 2022-01-28 NOTE — NUR
SHIFT SUMMARY
 
PT A&O X4. VSS. SPO2 > 92% ON RA. MONITOR SHOWING AFLUTTER, 's-140's.
PRN IV LOPRESSOR GIVEN X4 THIS SHIFT FOR HR >110 PER ORDERS. MD CLARK W/ NEW
ORDER FOR PO DIGOXEN X2 GIVEN TODAY. PT DIURESING PER ORDERS, BRUCE CATH
PATENT & DRAINING SIGNIFICANT AMOUNT OF OUTPUT, SEE I/O's.

## 2022-01-28 NOTE — NUR
CARDIAC EVENTS
PT HAD A 12 RUN OF VTACH AT 0321, ASYMTOMATIC AND VITAL SIGNS STABLE. AT 0400
PT HAD ANOTHER 11 RUN OF TACH AND RAN REMAINED IN 'S. PT GIVEN
MEDICATIONS PER EMAR WITH SUCESS, RATE NOW IN 'S. AWATING TO CALL
HOSPITALIST FOR FURTHER INSTRUCTIONS.

## 2022-01-28 NOTE — NUR
SHIFT SUMMARY
ASSUMED CARE OF PT AT 1900. PT IS A/OX4. HEART SOUNDS REGULAR, LUNG SOUNDS
DIMINISHED ON THE L AND HAS CRACKLES ON THE R. SEE PREVIOUS NOTE FOR CARDIAC
EVENTS. PT WAS CONTIENT. BRUCE DRAINING CLEAR YELLOW URINE. PT HAD NO NEW
COMPLAINTS.
 
PT SISTER IN LAW CALLED ABOUT PT FOR AN UPDATE. PT CONSENTED FOR THIS NURSE TO
TALK WITH SISTER. SISTER CONCERNED THAT PT WILL NOT TAKE CARE OF HIMSELF ONCE
HE LEAVES HOSPITAL. SISTER, DANUTA, IS WORRIED PT WILL NOT GO GET MEDICATION
AGAIN AND END UP IN THE HOSPITAL. WHEN ASKED IF SHE KNEW ABOUT PT DRUG HISTORY
DANUTA DENIED KNOWNING. SISTER DENIES KNOWING MUCH ABOUT PT MEDICAL HISTORY BUT
IS CONCERNED AND WONDERING IF PT IS GOING TO DIE SOON.

## 2022-01-29 LAB
ALBUMIN SERPL BCP-MCNC: 2.5 G/DL (ref 3.4–5)
ALBUMIN/GLOB SERPL: 0.5 {RATIO} (ref 0.8–1.8)
ALT SERPL W P-5'-P-CCNC: 62 U/L (ref 12–78)
ANION GAP SERPL CALCULATED.4IONS-SCNC: 7 MMOL/L (ref 6–16)
AST SERPL W P-5'-P-CCNC: 35 U/L (ref 12–37)
BASOPHILS # BLD AUTO: 0.07 K/MM3 (ref 0–0.23)
BASOPHILS NFR BLD AUTO: 1 % (ref 0–2)
BILIRUB SERPL-MCNC: 0.8 MG/DL (ref 0.1–1)
BUN SERPL-MCNC: 32 MG/DL (ref 8–24)
CALCIUM SERPL-MCNC: 8.5 MG/DL (ref 8.5–10.1)
CHLORIDE SERPL-SCNC: 94 MMOL/L (ref 98–108)
CO2 SERPL-SCNC: 31 MMOL/L (ref 21–32)
CREAT SERPL-MCNC: 1.5 MG/DL (ref 0.6–1.2)
DEPRECATED RDW RBC AUTO: 55.3 FL (ref 35.1–46.3)
EOSINOPHIL # BLD AUTO: 0.13 K/MM3 (ref 0–0.68)
EOSINOPHIL NFR BLD AUTO: 1 % (ref 0–6)
ERYTHROCYTE [DISTWIDTH] IN BLOOD BY AUTOMATED COUNT: 17.6 % (ref 11.7–14.2)
GLOBULIN SER CALC-MCNC: 4.8 G/DL (ref 2.2–4)
GLUCOSE SERPL-MCNC: 142 MG/DL (ref 70–99)
HCT VFR BLD AUTO: 38.2 % (ref 37–53)
HGB BLD-MCNC: 11.9 G/DL (ref 13.5–17.5)
IMM GRANULOCYTES # BLD AUTO: 0.04 K/MM3 (ref 0–0.1)
IMM GRANULOCYTES NFR BLD AUTO: 0 % (ref 0–1)
LYMPHOCYTES # BLD AUTO: 1.1 K/MM3 (ref 0.84–5.2)
LYMPHOCYTES NFR BLD AUTO: 11 % (ref 21–46)
MAGNESIUM SERPL-MCNC: 1.8 MG/DL (ref 1.6–2.4)
MCHC RBC AUTO-ENTMCNC: 31.2 G/DL (ref 31.5–36.5)
MCV RBC AUTO: 86 FL (ref 80–100)
MONOCYTES # BLD AUTO: 0.76 K/MM3 (ref 0.16–1.47)
MONOCYTES NFR BLD AUTO: 8 % (ref 4–13)
NEUTROPHILS # BLD AUTO: 7.79 K/MM3 (ref 1.96–9.15)
NEUTROPHILS NFR BLD AUTO: 79 % (ref 41–73)
NRBC # BLD AUTO: 0 K/MM3 (ref 0–0.02)
NRBC BLD AUTO-RTO: 0 /100 WBC (ref 0–0.2)
PLATELET # BLD AUTO: 602 K/MM3 (ref 150–400)
POTASSIUM SERPL-SCNC: 4 MMOL/L (ref 3.5–5.5)
PROT SERPL-MCNC: 7.3 G/DL (ref 6.4–8.2)
SODIUM SERPL-SCNC: 132 MMOL/L (ref 136–145)
TROPONIN I SERPL-MCNC: 0.46 NG/ML (ref 0–0.04)

## 2022-01-29 NOTE — NUR
SHIFT SUMMARY
ASSUMED CARE OF PT AT 1900. PT IS A/OX4. HEART SOUNDS TACHY. PT RESPONDED WELL
TO CARDIZEM PUSH BUT NEEDED TO BE STARTED ON DRIP DUE TO INCREASING HR 1HR
AFTER PUSH. AT AROUND 0400 PT RATE IS CONTROLED IN THE 90'S AT 5MG/HR. PT WAS
A SBA TO BATHROOM. BRUCE DRAINING CLEAR YELLOW URINE. PT IS UPSET ABOUT BEING
HERE AND WANTS TO GO HOME.
 
CALL LIGHT IN REACH, BED IN LOWEST POSITION.

## 2022-01-29 NOTE — NUR
INCREASED HEARTRATE
PT HEART RATE WAS INCREASED IN 'S AT THE START OF SHIFT, DESPITE
DAYSHIFT GVING PRN LOPRESSOR. HOSPITALIST JERONIMO CALLED AFTER THIS NURSE ALSO
GAVE PRN MEDICATIONS WITH NO RESULTS. SAID TO TRY GIVING MORE METOPROLO ORALLY
TO SEE IF RATE DECRASED, NO EFFECTS. PT GIVEN ANOTHER LOPRESSOR PUSH WITH NO
EFFECT. HOSPITALIST ANDRA CONSULTED AND REVEIWED CHART AND SAID TO TRY
CARDIZEM PUSH. PT RESPONDED INSTANTLY AND RATE IS NOW IN 'S. WILL
CONTINUE TO MONITOR. VITAL SIGNS STABLE T/O EVENTS. NO COMPLAINTS FROM PT.

## 2022-01-29 NOTE — NUR
SHIFT SUMMARY
 
PT A&O X4. VSS. SPO2 > 92% ON RA. MONITOR SHOWING AFLUTTER, HR 90's-130's W/
CARDIZEM GTT INFUSING @ 5 MG/HR MAJORITY OF DAY. PT THEN SUSTAINING HR
70's-90's W/ MD ISTRATE W/ NEW ORDER FOR PO METOPROLOL TARTATE X1 GIVEN, THEN
TO CONTINUE W/ ALREADY SCHEDULED METOPROLOL PER EMAR & LEAVE CARDIZEM GTT ON
HOLD IF PT HR TOLERATING. PT HR CURRENTLY AFLUTTER, 80's-90's. PT DIURESING
PER ORDERS, BRUCE CATH PATENT & DRAINING. NO OTHER EVENTS.

## 2022-01-30 LAB
ALBUMIN SERPL BCP-MCNC: 2.6 G/DL (ref 3.4–5)
ALBUMIN/GLOB SERPL: 0.5 {RATIO} (ref 0.8–1.8)
ALT SERPL W P-5'-P-CCNC: 56 U/L (ref 12–78)
ANION GAP SERPL CALCULATED.4IONS-SCNC: 6 MMOL/L (ref 6–16)
AST SERPL W P-5'-P-CCNC: 34 U/L (ref 12–37)
BASOPHILS # BLD AUTO: 0.07 K/MM3 (ref 0–0.23)
BASOPHILS NFR BLD AUTO: 1 % (ref 0–2)
BILIRUB SERPL-MCNC: 0.8 MG/DL (ref 0.1–1)
BUN SERPL-MCNC: 32 MG/DL (ref 8–24)
CALCIUM SERPL-MCNC: 8.5 MG/DL (ref 8.5–10.1)
CHLORIDE SERPL-SCNC: 92 MMOL/L (ref 98–108)
CO2 SERPL-SCNC: 33 MMOL/L (ref 21–32)
CREAT SERPL-MCNC: 1.51 MG/DL (ref 0.6–1.2)
DEPRECATED RDW RBC AUTO: 56.4 FL (ref 35.1–46.3)
EOSINOPHIL # BLD AUTO: 0.18 K/MM3 (ref 0–0.68)
EOSINOPHIL NFR BLD AUTO: 2 % (ref 0–6)
ERYTHROCYTE [DISTWIDTH] IN BLOOD BY AUTOMATED COUNT: 17.5 % (ref 11.7–14.2)
GLOBULIN SER CALC-MCNC: 4.9 G/DL (ref 2.2–4)
GLUCOSE SERPL-MCNC: 195 MG/DL (ref 70–99)
HCT VFR BLD AUTO: 38.7 % (ref 37–53)
HGB BLD-MCNC: 12.1 G/DL (ref 13.5–17.5)
IMM GRANULOCYTES # BLD AUTO: 0.2 K/MM3 (ref 0–0.1)
IMM GRANULOCYTES NFR BLD AUTO: 2 % (ref 0–1)
LYMPHOCYTES # BLD AUTO: 1.13 K/MM3 (ref 0.84–5.2)
LYMPHOCYTES NFR BLD AUTO: 9 % (ref 21–46)
MAGNESIUM SERPL-MCNC: 1.8 MG/DL (ref 1.6–2.4)
MCHC RBC AUTO-ENTMCNC: 31.3 G/DL (ref 31.5–36.5)
MCV RBC AUTO: 87 FL (ref 80–100)
MONOCYTES # BLD AUTO: 0.82 K/MM3 (ref 0.16–1.47)
MONOCYTES NFR BLD AUTO: 7 % (ref 4–13)
NEUTROPHILS # BLD AUTO: 9.9 K/MM3 (ref 1.96–9.15)
NEUTROPHILS NFR BLD AUTO: 80 % (ref 41–73)
NRBC # BLD AUTO: 0 K/MM3 (ref 0–0.02)
NRBC BLD AUTO-RTO: 0 /100 WBC (ref 0–0.2)
PHOSPHATE SERPL-MCNC: 2.8 MG/DL (ref 2.5–4.9)
PLATELET # BLD AUTO: 650 K/MM3 (ref 150–400)
POTASSIUM SERPL-SCNC: 4.1 MMOL/L (ref 3.5–5.5)
PROT SERPL-MCNC: 7.5 G/DL (ref 6.4–8.2)
PROTHROMBIN TIME: 13 SEC (ref 9.7–11.5)
SODIUM SERPL-SCNC: 131 MMOL/L (ref 136–145)
TSH SERPL DL<=0.005 MIU/L-ACNC: 4.08 UIU/ML (ref 0.36–4.8)
UFH PPP CHRO-ACNC: <0.1 IU/ML

## 2022-01-30 NOTE — NUR
SHIFT SUMMARY
ASSUMED CARE OF PT AT 1900. PT IS A/OX4. PT HAD BEEN IRRITATED T/O THE SHIFT.
PT WAS UPSET ABOUT NOT TAKING A SHOWER AND BEING IN THE HOSPITAL FOR TOO LONG.
WHEN OFFERED TO GIVE PT SHOWER, PT DECLINED STATING " ILL BEING GOING HOME IN
THE MORNING. THIS AM AT 0500 PT REQUESTED TO USE THE BATHROOM TO POOP AND THEN
TAKE A SHOWER BUT PT IS NOT ON A DRIP AND HR IS ELEVATED IN THE 130S. THIS
UPSET THE PT AND HE TOLD THE CNA THAT HE WILL JUST LEAVE TODAY AND SHOWER AT
HOME. WHEN THIS NURSE WENT TO TALK ABOUT THIS EVENT, HE SAID THAT HE WAS JUST
READY TO GO HOME AND SNACKS WOULD MAKE HIM FEEL BETTER. PT HAD SNACKS T/O THE
NIGHT.
 
PT RATE INCREASED TO THE 140S GRADUALLY FROM THE START OF SHIFT. HOSPITALIST
MIKE WAS CONSULTED ABOUT 0000 ABOUT OTHER OPTIONS BECAUSE LOPRESSOR PUSHES ARE
NOT WORKING. HOSPITALIST REVEIWED CHART AND THOUGHT STARTING AN AMIODERONE DTT
WAS THE BEST OPTION SO PT COULD STILL GO HOME TODAY WITH ORAL AMIO IF IT
HELPED. PT RATE REMAINED BETWEEN 120-130 T/O THE NIGHT.
 
CALL LIGHT IN REACH, BED IN LOWEST POSITION.

## 2022-01-30 NOTE — NUR
SHIFT SUMMARY:
 
PT A&Ox4, MAINTAINING O2 SATS >93% ON RA, DENIES SOB OR CHEST PAIN/PRESSURE.
TELEMETRY CONTINUES TO SHOW AFLUTTER W/-130s, AMIODARONE INFUSING PER
ORDERS, PT MEDICATED PER ORDERS INCLUDING ONE TIME DOSE METOPROLOL TARTRATE
ORDERED BY DR CLARK, NEW INFUSION OF HEPARIN INITIATED PER ORDERS. PER DR CLARK, PT TO BE CLOSELY MONITORED FOR S/S OF GI BLEED GIVEN HIS HX, PT
INFORMED OF PLAN AND V/U. DIURESIS CONTINUES PER ORDER, BRUCE CATHETER PATENT
AND DRAINING TO GRAVITY, SBA TO RESTROOM.
AT THIS TIME, PT RESTING QUIETLY IN ROOM WITH TV ON AND CALL LIGHT W/IN REACH.
WILL CONTINUE TO MONITOR AND TREAT ACCORDINGLY UNTIL CHANGE OF SHIFT.

## 2022-01-30 NOTE — NUR
IV INFILTRATION W/AMIO GTT
DURING ASSESSMENT, PT HAVING REDNESS AND TENDERNESS AROUND L IV SITE. IV HAD
AMIO GTT RUNNING. IV REMOVED, PHARMACY NOTIFIED. ORDERED TO APPLY ICE. IF PT
CONT TO HAVE IRRITATION INSTRUCTED TO OBTAIN ORDER FOR HYDROCORTISONE CREAM.
ICE APPLIED. WILL CONT TO MONITOR.

## 2022-01-31 LAB
ALBUMIN SERPL BCP-MCNC: 2.5 G/DL (ref 3.4–5)
ALBUMIN/GLOB SERPL: 0.5 {RATIO} (ref 0.8–1.8)
ALT SERPL W P-5'-P-CCNC: 53 U/L (ref 12–78)
ANION GAP SERPL CALCULATED.4IONS-SCNC: 8 MMOL/L (ref 6–16)
AST SERPL W P-5'-P-CCNC: 38 U/L (ref 12–37)
BASOPHILS # BLD AUTO: 0.07 K/MM3 (ref 0–0.23)
BASOPHILS NFR BLD AUTO: 1 % (ref 0–2)
BILIRUB SERPL-MCNC: 0.6 MG/DL (ref 0.1–1)
BUN SERPL-MCNC: 32 MG/DL (ref 8–24)
CALCIUM SERPL-MCNC: 8.5 MG/DL (ref 8.5–10.1)
CHLORIDE SERPL-SCNC: 95 MMOL/L (ref 98–108)
CO2 SERPL-SCNC: 28 MMOL/L (ref 21–32)
CREAT SERPL-MCNC: 1.53 MG/DL (ref 0.6–1.2)
DEPRECATED RDW RBC AUTO: 53.6 FL (ref 35.1–46.3)
EOSINOPHIL # BLD AUTO: 0.11 K/MM3 (ref 0–0.68)
EOSINOPHIL NFR BLD AUTO: 1 % (ref 0–6)
ERYTHROCYTE [DISTWIDTH] IN BLOOD BY AUTOMATED COUNT: 17.4 % (ref 11.7–14.2)
GLOBULIN SER CALC-MCNC: 4.9 G/DL (ref 2.2–4)
GLUCOSE SERPL-MCNC: 163 MG/DL (ref 70–99)
HCT VFR BLD AUTO: 37.6 % (ref 37–53)
HGB BLD-MCNC: 12.2 G/DL (ref 13.5–17.5)
IMM GRANULOCYTES # BLD AUTO: 0.05 K/MM3 (ref 0–0.1)
IMM GRANULOCYTES NFR BLD AUTO: 1 % (ref 0–1)
LYMPHOCYTES # BLD AUTO: 1.45 K/MM3 (ref 0.84–5.2)
LYMPHOCYTES NFR BLD AUTO: 14 % (ref 21–46)
MAGNESIUM SERPL-MCNC: 1.8 MG/DL (ref 1.6–2.4)
MCHC RBC AUTO-ENTMCNC: 32.4 G/DL (ref 31.5–36.5)
MCV RBC AUTO: 84 FL (ref 80–100)
MONOCYTES # BLD AUTO: 1.07 K/MM3 (ref 0.16–1.47)
MONOCYTES NFR BLD AUTO: 11 % (ref 4–13)
NEUTROPHILS # BLD AUTO: 7.45 K/MM3 (ref 1.96–9.15)
NEUTROPHILS NFR BLD AUTO: 73 % (ref 41–73)
NRBC # BLD AUTO: 0 K/MM3 (ref 0–0.02)
NRBC BLD AUTO-RTO: 0 /100 WBC (ref 0–0.2)
PHOSPHATE SERPL-MCNC: 3 MG/DL (ref 2.5–4.9)
PLATELET # BLD AUTO: 602 K/MM3 (ref 150–400)
POTASSIUM SERPL-SCNC: 4.3 MMOL/L (ref 3.5–5.5)
PROT SERPL-MCNC: 7.4 G/DL (ref 6.4–8.2)
SODIUM SERPL-SCNC: 131 MMOL/L (ref 136–145)

## 2022-01-31 NOTE — NUR
SHIFT SUMMARY:
 
PT CONTINUES A&Ox4, COOPERATIVE WITH CARE. AFLUTTER WITH RATE IN 120s-130s
CONTINUES ON MONITOR. PT REPORTS NO CHEST PAIN AND MINIMAL SOB WHEN
TRANSFERING TO/FROM BED/CHAIR. DR CLARK UPDATED ON PT STATUS T/OUT DAY,
CARDIOLOGY CONSULT PLACED, DR HAAS TO/FROM BEDSIDE FOR EVAL, MEDICATION
CHANGES WERE MADE, SEE EMAR. LUE CONTINUES RED, PT REPORTS DISCOMFORT WHEN
TOUCHED, ICE APPLIED T/OUT AFTERNOON, PHARMACY CONSULTED AND STATED TO ICE
AREA AND TO REQUEST ORDER FOR HYDROCORTISONE CREAM IF PAIN WORSENS, PT
TOLERATING WELL AT THIS TIME. PT ABLE TO MAINTAIN O2 SATS >93% ON RA,
INDWELLING BRUCE CONTINUES DRAINING TO GRAVITY. AT THIS TIME, PT RESTING IN
BED WITH TV ON AND CALL LIGHT WITHIN REACH. WILL CONTINUE TO MONITOR AND TREAT
ACCORDINGLY UNTIL CHANGE OF SHIFT.

## 2022-01-31 NOTE — NUR
SHIFT SUMMARY
PT ALERT AND ORIENTED X 4. HR TACHY AT TIMES 120-130. AFLUTTER. BP STABLE. NO
CP OR PRESSURE. IV INFILTRATED IN LUE WITH AMIO GTT, PHARMACY CONSULTED. ICE
APPLIED. REDNESS ON ARM HAS MINIMIZED. PT STATES IT "DOESNT HURT ANYMORE."
AMIO GTT STOPPED DURING SHIFT, SEE EMAR AND NOTES. PT ABLE TO TURN SELF IN
BED. NO CP OR PRESSURE REPORTED. OXYGEN SATURATION MAINTAINED ABOVE 92% ON
RA. BRUCE TO GRAVITY DRAIN. HEPARIN GTT, SEE EMAR. PHYSICIAN NOTIFIED ABOUT
PT'S COUGH, MEDICATED PER PHYSICIAN ORDER. WILL CONT TO MONITOR UNTIL REPORT
GIVEN TO DAYSHIFT RN.

## 2022-02-01 LAB
ALBUMIN SERPL BCP-MCNC: 2.9 G/DL (ref 3.4–5)
ALBUMIN/GLOB SERPL: 0.6 {RATIO} (ref 0.8–1.8)
ALT SERPL W P-5'-P-CCNC: 52 U/L (ref 12–78)
ANION GAP SERPL CALCULATED.4IONS-SCNC: 10 MMOL/L (ref 6–16)
AST SERPL W P-5'-P-CCNC: 39 U/L (ref 12–37)
BASOPHILS # BLD AUTO: 0.08 K/MM3 (ref 0–0.23)
BASOPHILS NFR BLD AUTO: 1 % (ref 0–2)
BILIRUB SERPL-MCNC: 0.8 MG/DL (ref 0.1–1)
BUN SERPL-MCNC: 36 MG/DL (ref 8–24)
CALCIUM SERPL-MCNC: 9 MG/DL (ref 8.5–10.1)
CHLORIDE SERPL-SCNC: 93 MMOL/L (ref 98–108)
CO2 SERPL-SCNC: 26 MMOL/L (ref 21–32)
CREAT SERPL-MCNC: 1.72 MG/DL (ref 0.6–1.2)
DEPRECATED RDW RBC AUTO: 55.7 FL (ref 35.1–46.3)
EOSINOPHIL # BLD AUTO: 0.1 K/MM3 (ref 0–0.68)
EOSINOPHIL NFR BLD AUTO: 1 % (ref 0–6)
ERYTHROCYTE [DISTWIDTH] IN BLOOD BY AUTOMATED COUNT: 17.9 % (ref 11.7–14.2)
GLOBULIN SER CALC-MCNC: 4.7 G/DL (ref 2.2–4)
GLUCOSE SERPL-MCNC: 126 MG/DL (ref 70–99)
HCT VFR BLD AUTO: 42.4 % (ref 37–53)
HGB BLD-MCNC: 13.2 G/DL (ref 13.5–17.5)
IMM GRANULOCYTES # BLD AUTO: 0.08 K/MM3 (ref 0–0.1)
IMM GRANULOCYTES NFR BLD AUTO: 1 % (ref 0–1)
LYMPHOCYTES # BLD AUTO: 1.58 K/MM3 (ref 0.84–5.2)
LYMPHOCYTES NFR BLD AUTO: 16 % (ref 21–46)
MAGNESIUM SERPL-MCNC: 1.9 MG/DL (ref 1.6–2.4)
MCHC RBC AUTO-ENTMCNC: 31.1 G/DL (ref 31.5–36.5)
MCV RBC AUTO: 87 FL (ref 80–100)
MONOCYTES # BLD AUTO: 1.2 K/MM3 (ref 0.16–1.47)
MONOCYTES NFR BLD AUTO: 12 % (ref 4–13)
NEUTROPHILS # BLD AUTO: 6.84 K/MM3 (ref 1.96–9.15)
NEUTROPHILS NFR BLD AUTO: 69 % (ref 41–73)
NRBC # BLD AUTO: 0 K/MM3 (ref 0–0.02)
NRBC BLD AUTO-RTO: 0 /100 WBC (ref 0–0.2)
PLATELET # BLD AUTO: 594 K/MM3 (ref 150–400)
POTASSIUM SERPL-SCNC: 5.4 MMOL/L (ref 3.5–5.5)
PROT SERPL-MCNC: 7.6 G/DL (ref 6.4–8.2)
SODIUM SERPL-SCNC: 129 MMOL/L (ref 136–145)

## 2022-02-01 NOTE — NUR
Shift note:
Pt is A&O, pleasant with cares. VSS on RA, BP on the softer side. PO
metoprolol and amio given per orders, digoxin was ordered by cardio this AM,
but then notifed nursing staff to not give due to high potassium. Pt
complained of increased pain in left arm where the amio had infiltrated
yesterday. Ice applied with little help, MD notified. Hyaluronidase
subcutaneously given per orders in area of infiltrated amio. Pt has
nonproductive cough, prn lozenges and phenergan w/ codiene given. Tele:
aflutter 100-120s, mainly 110-120s throughout day. Encouraged SCDs, pt
tolerated for maybe an hour. Insintive spirameter given to pt and tought how
to use, encouraged to do 10 reps per hour while awake. Braun removed today and
pt has voided several times since. Spoke with MD and still no anticoag at this
time due to hx of GI bleed recently. MD said to keep encouraging SCDs, pt only
tolerated for 1 hr this evening and then refused to put them back on.

## 2022-02-01 NOTE — NUR
Pt reporting that left arm where IV infiltrated amiodarone yesterday is really
sore today. I notified MD and Dr. Lockwood wanted me to call pharmacy to see if
there are any suggested meds for ammio infiltration. They suggested
hyaluronidase subcutaneous. I notifed the MD and she will take a look at site
in person and then possibly order dose.

## 2022-02-01 NOTE — NUR
Notified MD that pt is not on anticoag. Pt had recent GI bleed and anticoags
were d/c. MD encouraged SCDs, pt had been refusing SCDs. I educated pt on
importance of the SCDs and pt agreed to wear them. Placed SCDs on Bilateral
lower ext.

## 2022-02-01 NOTE — NUR
Aparna d/c @ 1330. Hyaluronidase was given in area of infiltrated amiodarone.
Amiodarone infiltrated yesterday and pt expressed more pain and swelling, MD
ordered hyaluronidase. Several subcutaneous injections given in left arm where
infiltration occured.

## 2022-02-01 NOTE — NUR
SHIFT SUMMARY
PT ALERT AND ORIENTED X 4. HR TACHY, 110'S AT BEGINNING OF SHIFT 'S-130
BY END OF SHIFT. BP STABLE. MAP REMAINED ABOVE 65. OXYGEN SATURATION
MAINTAINED ABOVE 92% ON RA. PT ABLE TO TURN SELF IN BED. PT REFUSING TO WEAR
SCD'S D/T IRRITATION IN LEGS. NO CP OR PRESSURE. BRUCE TO GRAVITY DRAIN. WILL
CONT TO MONITOR UNTIL REPORT GIVEN TO XIOMARA RODRIGUEZ.

## 2022-02-02 LAB
ANION GAP SERPL CALCULATED.4IONS-SCNC: 6 MMOL/L (ref 6–16)
BASOPHILS # BLD AUTO: 0.06 K/MM3 (ref 0–0.23)
BASOPHILS NFR BLD AUTO: 1 % (ref 0–2)
BUN SERPL-MCNC: 52 MG/DL (ref 8–24)
CALCIUM SERPL-MCNC: 9 MG/DL (ref 8.5–10.1)
CHLORIDE SERPL-SCNC: 92 MMOL/L (ref 98–108)
CO2 SERPL-SCNC: 28 MMOL/L (ref 21–32)
CREAT SERPL-MCNC: 2.08 MG/DL (ref 0.6–1.2)
DEPRECATED RDW RBC AUTO: 54.4 FL (ref 35.1–46.3)
EOSINOPHIL # BLD AUTO: 0.08 K/MM3 (ref 0–0.68)
EOSINOPHIL NFR BLD AUTO: 1 % (ref 0–6)
ERYTHROCYTE [DISTWIDTH] IN BLOOD BY AUTOMATED COUNT: 17.5 % (ref 11.7–14.2)
GLUCOSE SERPL-MCNC: 157 MG/DL (ref 70–99)
HCT VFR BLD AUTO: 39.7 % (ref 37–53)
HCT VFR BLD AUTO: 41.4 % (ref 37–53)
HGB BLD-MCNC: 13 G/DL (ref 13.5–17.5)
HGB BLD-MCNC: 13.1 G/DL (ref 13.5–17.5)
IMM GRANULOCYTES # BLD AUTO: 0.07 K/MM3 (ref 0–0.1)
IMM GRANULOCYTES NFR BLD AUTO: 1 % (ref 0–1)
LYMPHOCYTES # BLD AUTO: 1.53 K/MM3 (ref 0.84–5.2)
LYMPHOCYTES NFR BLD AUTO: 14 % (ref 21–46)
MCHC RBC AUTO-ENTMCNC: 31.6 G/DL (ref 31.5–36.5)
MCV RBC AUTO: 85 FL (ref 80–100)
MONOCYTES # BLD AUTO: 1.26 K/MM3 (ref 0.16–1.47)
MONOCYTES NFR BLD AUTO: 11 % (ref 4–13)
NEUTROPHILS # BLD AUTO: 8.1 K/MM3 (ref 1.96–9.15)
NEUTROPHILS NFR BLD AUTO: 73 % (ref 41–73)
NRBC # BLD AUTO: 0 K/MM3 (ref 0–0.02)
NRBC BLD AUTO-RTO: 0 /100 WBC (ref 0–0.2)
PLATELET # BLD AUTO: 635 K/MM3 (ref 150–400)
POTASSIUM SERPL-SCNC: 5.2 MMOL/L (ref 3.5–5.5)
SODIUM SERPL-SCNC: 126 MMOL/L (ref 136–145)

## 2022-02-02 NOTE — NUR
LVM FOR DR. MAXWELL REGARDING CTA RESULTS. WORSONING PE SEEN ON STUDY. WAITING
FOR A CALL BACK TO SEE IF THERE WILL BE NEW ORDERS.

## 2022-02-02 NOTE — NUR
Pt reporting SOB at rest, sudden onset. Said this has been happening a few
times over the past 24hrs. Will feel SOB and get anxiet/ panic feeling. MD
notified and will round on pt. All VSS on RA. Oxygen sat 94-98%.

## 2022-02-02 NOTE — NUR
Shift note:
Pt is A&O, anxious at times. Tele: aflutter 100-110s mainly. VSS on RA. This
afternoon pt complained of sudden onset SOB and felt anxious/panic feeling. Pt
denied any chest pain. MD Notified and STAT labs, chest xray and d-dimer
ordered. Ddimer was elevated, STAT CTA. CTA showed worsening PE, heparin gtt
started.

## 2022-02-02 NOTE — NUR
Pt Ddimer was elevated, Dr. Lockwood updated, CTA ordered STAT. VSS on RA, BP
on the softer side, but MAP is 80

## 2022-02-02 NOTE — NUR
UPDATE
AT END OF SHIFT PT REPORTED TO FEEL SHORT OF BREATH. PT'S OXYGEN SATURATION AT
100% ON RA. PT REPORTS TO FEEL THIS WAY WHEN HE NEEDS LASIX AND IT IS NOT A
NEW FEELING. DAYSROMERO RN NOTIFIED. PT STATES HE FEELS COMFORTABLE AT THIS
TIME.

## 2022-02-02 NOTE — NUR
SHIFT SUMMARY
PT ALERT AND ORIENTED X 4. HR STABLE, 90'S-120'S. BP STABLE. MAP ABOVE 65.
OXYGEN SATURATION MAINTAINED ABOVE 92% ON RA. NO CP OR PRESSURE REPORTED. PT
ABLE TO TURN SELF IN BED. PT SLEPT DURING SHIFT. WILL CONT TO MONITOR UNTIL
REPORT GIVEN TO DAYSHIFT RN.

## 2022-02-03 LAB
ALBUMIN SERPL BCP-MCNC: 2.6 G/DL (ref 3.4–5)
ALBUMIN/GLOB SERPL: 0.5 {RATIO} (ref 0.8–1.8)
ALT SERPL W P-5'-P-CCNC: 43 U/L (ref 12–78)
ANION GAP SERPL CALCULATED.4IONS-SCNC: 10 MMOL/L (ref 6–16)
AST SERPL W P-5'-P-CCNC: 38 U/L (ref 12–37)
BASOPHILS # BLD AUTO: 0.07 K/MM3 (ref 0–0.23)
BASOPHILS NFR BLD AUTO: 1 % (ref 0–2)
BILIRUB SERPL-MCNC: 0.7 MG/DL (ref 0.1–1)
BUN SERPL-MCNC: 63 MG/DL (ref 8–24)
CALCIUM SERPL-MCNC: 9 MG/DL (ref 8.5–10.1)
CHLORIDE SERPL-SCNC: 92 MMOL/L (ref 98–108)
CO2 SERPL-SCNC: 27 MMOL/L (ref 21–32)
CREAT SERPL-MCNC: 2.04 MG/DL (ref 0.6–1.2)
DEPRECATED RDW RBC AUTO: 52.8 FL (ref 35.1–46.3)
EOSINOPHIL # BLD AUTO: 0.03 K/MM3 (ref 0–0.68)
EOSINOPHIL NFR BLD AUTO: 0 % (ref 0–6)
ERYTHROCYTE [DISTWIDTH] IN BLOOD BY AUTOMATED COUNT: 17.2 % (ref 11.7–14.2)
GLOBULIN SER CALC-MCNC: 5.4 G/DL (ref 2.2–4)
GLUCOSE SERPL-MCNC: 119 MG/DL (ref 70–99)
HCT VFR BLD AUTO: 38.8 % (ref 37–53)
HGB BLD-MCNC: 12.4 G/DL (ref 13.5–17.5)
IMM GRANULOCYTES # BLD AUTO: 0.05 K/MM3 (ref 0–0.1)
IMM GRANULOCYTES NFR BLD AUTO: 1 % (ref 0–1)
LYMPHOCYTES # BLD AUTO: 1.91 K/MM3 (ref 0.84–5.2)
LYMPHOCYTES NFR BLD AUTO: 19 % (ref 21–46)
MCHC RBC AUTO-ENTMCNC: 32 G/DL (ref 31.5–36.5)
MCV RBC AUTO: 83 FL (ref 80–100)
MONOCYTES # BLD AUTO: 0.89 K/MM3 (ref 0.16–1.47)
MONOCYTES NFR BLD AUTO: 9 % (ref 4–13)
NEUTROPHILS # BLD AUTO: 6.9 K/MM3 (ref 1.96–9.15)
NEUTROPHILS NFR BLD AUTO: 70 % (ref 41–73)
NRBC # BLD AUTO: 0 K/MM3 (ref 0–0.02)
NRBC BLD AUTO-RTO: 0 /100 WBC (ref 0–0.2)
PLATELET # BLD AUTO: 587 K/MM3 (ref 150–400)
POTASSIUM SERPL-SCNC: 4.2 MMOL/L (ref 3.5–5.5)
PROT SERPL-MCNC: 8 G/DL (ref 6.4–8.2)
SODIUM SERPL-SCNC: 129 MMOL/L (ref 136–145)

## 2022-02-03 NOTE — NUR
Pt is sititng up in bed and alert. Pt talks about his life growing up in
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
business and the connection he has with the [facility]. Pt states that
he is really a self-made man and that he is not rattled by much. He goes on to
explain that the on going batlle to manage his heart is an annoyance and
inconveient because he is the caregiver for his 91 y/o mother. I provide
therapeutic listening, companionship and gentle . Pt responds well and
shows signs of an elevated mood. Pt voices appreciation for the visit. I will
continue to remain available to patient and family.

## 2022-02-03 NOTE — NUR
Shift note:
Pt is A&O, pleasant with cares. VSS on RA. Tele: aflutter mainly 100s today,
briefly dropped into 90s. Heparin gtt is running at 17units/kg/hr. MD added
additional sleep aid for night time.

## 2022-02-03 NOTE — NUR
SHIFT SUMMARY
 
PT RESTED  WELL THROUGH THE NIGHT. ALERT AND ORIENTED, ABLE TO MAKE NEEDS
KNOWN. COOPERATIVE WITH PLAN OF CARE. SATS >95% ON ROOM AIR. TELE READS SINUS
TACH 104. NO C/O CHEST PAIN. REMAINS ON HEP GTT FOR PE'S. VOIDING TO URINAL,
MINIMAL UOP. NO BM. VSS.
 
CALL LIGHT WITHIN REACH, BED IN LOWEST POSITION. WILL CONTINUE TO MONITOR.

## 2022-02-04 LAB
ALBUMIN SERPL BCP-MCNC: 2.7 G/DL (ref 3.4–5)
ANION GAP SERPL CALCULATED.4IONS-SCNC: 10 MMOL/L (ref 6–16)
BUN SERPL-MCNC: 69 MG/DL (ref 8–24)
CALCIUM SERPL-MCNC: 9.3 MG/DL (ref 8.5–10.1)
CHLORIDE SERPL-SCNC: 91 MMOL/L (ref 98–108)
CO2 SERPL-SCNC: 26 MMOL/L (ref 21–32)
CREAT SERPL-MCNC: 2.26 MG/DL (ref 0.6–1.2)
GLUCOSE SERPL-MCNC: 162 MG/DL (ref 70–99)
HCT VFR BLD AUTO: 37.8 % (ref 37–53)
HGB BLD-MCNC: 12.3 G/DL (ref 13.5–17.5)
PHOSPHATE SERPL-MCNC: 4 MG/DL (ref 2.5–4.9)
POTASSIUM SERPL-SCNC: 4.6 MMOL/L (ref 3.5–5.5)
SODIUM SERPL-SCNC: 127 MMOL/L (ref 136–145)

## 2022-02-04 NOTE — NUR
CARE ASSUMPTION
 
PATIENT IS ALERT AND ORIENTATED X4. NEURO IS INTACT, PERRLA, NO NUMBNESS OR
TINGLING. VSS. SPO2 >90% ON RA. TELE AFLUTTER 90S. PATIENT REPORTS NO CHEST
PAIN/PRESSURE, HEADACHE, OR SHORTNESS OF BREATH. MD ANGELES IN TO SEE
PATIENT THIS AM AND DISCOUNTINED LASIX DUE TO PATIENT LAB VALUES. THIS RN WILL
CONTINUE TO MONITOR AND ASSESS PATIENT INPUT AND OUTPUT AND IF NEEDED BLADDER
SCAN IF PATIENT HAS MINIMAL OUTPUT. LUNG SOUNDS CLEAR/DIM. THIS RN FOUND LEFT
PEDAL PULSE VIA DOPPLER, AND BOTH PEDAL PULSES ARE FAINT. STRONG RADIAL
PULSES. SEE SHIFT ASSESSMENT FOR FULL DETAILS. HEPERAIN INFUSING AT 16U/KG/HR,
AND VERFIED NEW DOSE WITH RUFUS RODRIGUEZ. PATIENT HAS NO QUESTIONS OR CONCERNS AT
THIS TIME AND IS AWARE OF THE PLAN OF CARE. CALL LIGHT WITHIN REACH AND WILL
CONTINUE TO MONITOR AND PROVIDE CARE.

## 2022-02-04 NOTE — NUR
SHIFT SUMMARY
 
PATIENT TRANSFERRED FROM PCU AT 1555. PATIENT SETTLED INTO ROOM. PATIENT
DENIES PAIN, NAUSEA, SHORTNESS OF BREATH. PATIENT IS INDEPENDENT IN ROOM.
PATIENT IS ON A HEPARIN DRIP AT 16 U/KG/HR. 2 RN KATLYN. NEW BAG HUNG. PATIENT
IS EATING AND DRINKING WELL. TELE CURRENTLY SR AT 97. PATIENT A&O X4. PATIENT
IS PLEASANT AND COOPERATIVE WITH CARE.

## 2022-02-04 NOTE — NUR
SHIFT SUMMARY
 
PT RESTED WELL THROUGH THE NIGHT. ALERT AND ORIENTED, ABLE TO MAKE NEEDS
KNWON. COOPERATIVE WITH PLAN OF CARE. SATS >95% ON ROOM AIR. TELE READS
AFLUTTER 100'S. VOIDING TO URINAL - MINIMAL OUTPUT DESPITE IV LASIX GIVEN. NO
C/O PAIN. VSS.
 
CALL LIGHT WITHIN REACH, BED IN LOWEST POSITION. WILL CONTINUE TO MONITOR.

## 2022-02-04 NOTE — NUR
REPORT TO MEDICAL FLOOR RN
 
THIS RN GAVE REPORT TO RN. ALL PATIENT BELONGINGS GATHERED AND TAKEN UP WITH
PATIENT. PATIENT VSS. NO ACUTE CHANGES DURING MY TIME WITH THE PATIENT.
PATIENT BROUGHT TO NEW ROOM BY PATIENT CARE TECH VIA WHEEL CHAIR WITH HEPARIN
DRIP INFUSING AT 16U/KG/HR.

## 2022-02-05 LAB
ALBUMIN SERPL BCP-MCNC: 2.8 G/DL (ref 3.4–5)
ANION GAP SERPL CALCULATED.4IONS-SCNC: 10 MMOL/L (ref 6–16)
BASOPHILS # BLD AUTO: 0.05 K/MM3 (ref 0–0.23)
BASOPHILS NFR BLD AUTO: 1 % (ref 0–2)
BUN SERPL-MCNC: 69 MG/DL (ref 8–24)
CALCIUM SERPL-MCNC: 9.2 MG/DL (ref 8.5–10.1)
CHLORIDE SERPL-SCNC: 92 MMOL/L (ref 98–108)
CO2 SERPL-SCNC: 26 MMOL/L (ref 21–32)
CREAT SERPL-MCNC: 2.02 MG/DL (ref 0.6–1.2)
DEPRECATED RDW RBC AUTO: 53.5 FL (ref 35.1–46.3)
EOSINOPHIL # BLD AUTO: 0.02 K/MM3 (ref 0–0.68)
EOSINOPHIL NFR BLD AUTO: 0 % (ref 0–6)
ERYTHROCYTE [DISTWIDTH] IN BLOOD BY AUTOMATED COUNT: 17.5 % (ref 11.7–14.2)
GLUCOSE SERPL-MCNC: 142 MG/DL (ref 70–99)
HCT VFR BLD AUTO: 38.8 % (ref 37–53)
HGB BLD-MCNC: 12.2 G/DL (ref 13.5–17.5)
IMM GRANULOCYTES # BLD AUTO: 0.05 K/MM3 (ref 0–0.1)
IMM GRANULOCYTES NFR BLD AUTO: 1 % (ref 0–1)
LYMPHOCYTES # BLD AUTO: 1.64 K/MM3 (ref 0.84–5.2)
LYMPHOCYTES NFR BLD AUTO: 17 % (ref 21–46)
MCHC RBC AUTO-ENTMCNC: 31.4 G/DL (ref 31.5–36.5)
MCV RBC AUTO: 84 FL (ref 80–100)
MONOCYTES # BLD AUTO: 0.86 K/MM3 (ref 0.16–1.47)
MONOCYTES NFR BLD AUTO: 9 % (ref 4–13)
NEUTROPHILS # BLD AUTO: 7.13 K/MM3 (ref 1.96–9.15)
NEUTROPHILS NFR BLD AUTO: 73 % (ref 41–73)
NRBC # BLD AUTO: 0 K/MM3 (ref 0–0.02)
NRBC BLD AUTO-RTO: 0 /100 WBC (ref 0–0.2)
PHOSPHATE SERPL-MCNC: 4.1 MG/DL (ref 2.5–4.9)
PLATELET # BLD AUTO: 574 K/MM3 (ref 150–400)
POTASSIUM SERPL-SCNC: 5.3 MMOL/L (ref 3.5–5.5)
SODIUM SERPL-SCNC: 128 MMOL/L (ref 136–145)

## 2022-02-05 NOTE — NUR
SHIFT SUMMARY
PT IS A&O, PLEASANT AND CO-OP. SLEEPING MOST OF THE DAY, OFF AND ON, UNTIL
THIS AFTERNOON. WOKE FOR CARE AS NEEDED. HEPARIN DRIP INFUSING PER PHARMACY
REMAINING UNCHANGED TO PRESENT PER LAST ORDERS. IVF BOLUS GIVEN AND COMPLETE.
AM METOPROLOL HELD PER DR VILLALPANDO FOR AM VS; SEE CHART. PT IS INDEPENDENT IN
, MOSTLY USING URINAL AT BS D/T IV LINES. EATING AND DRINKING WELL. NO C/O.
CALL LT IN REACH.

## 2022-02-05 NOTE — NUR
SHIFT SUMMARY
A/O, ABLE TO MAKE NEEDS KNOWN. COOPERATIVE WITH CARE. CALLS AND ANSWERS
QUESTIONS APPROPRIATELY. AMBULATES INDEPENDENTLY IN ROOM; REQUIRES ASSISTANCE
/c IV POLE. NO ACUTE CHANGES NOTED OVERNIGHT. APPEARED TO REST MUCH OF THE
NIGHT. BED REMAINED IN LOWEST POSITION. CALL LIGHT AND BELONGINGS WITHIN
REACH. REPORT TO ONCOMING RN.

## 2022-02-06 LAB
ALBUMIN SERPL BCP-MCNC: 2.4 G/DL (ref 3.4–5)
ANION GAP SERPL CALCULATED.4IONS-SCNC: 11 MMOL/L (ref 6–16)
BUN SERPL-MCNC: 59 MG/DL (ref 8–24)
CALCIUM SERPL-MCNC: 8.7 MG/DL (ref 8.5–10.1)
CHLORIDE SERPL-SCNC: 99 MMOL/L (ref 98–108)
CO2 SERPL-SCNC: 22 MMOL/L (ref 21–32)
CREAT SERPL-MCNC: 1.67 MG/DL (ref 0.6–1.2)
DEPRECATED RDW RBC AUTO: 54.4 FL (ref 35.1–46.3)
ERYTHROCYTE [DISTWIDTH] IN BLOOD BY AUTOMATED COUNT: 18.4 % (ref 11.7–14.2)
GLUCOSE SERPL-MCNC: 189 MG/DL (ref 70–99)
HCT VFR BLD AUTO: 41.4 % (ref 37–53)
HGB BLD-MCNC: 13.1 G/DL (ref 13.5–17.5)
MCHC RBC AUTO-ENTMCNC: 31.6 G/DL (ref 31.5–36.5)
MCV RBC AUTO: 85 FL (ref 80–100)
NRBC # BLD AUTO: 0 K/MM3 (ref 0–0.02)
NRBC BLD AUTO-RTO: 0 /100 WBC (ref 0–0.2)
PHOSPHATE SERPL-MCNC: 3.3 MG/DL (ref 2.5–4.9)
PLATELET # BLD AUTO: 418 K/MM3 (ref 150–400)
POTASSIUM SERPL-SCNC: 4.5 MMOL/L (ref 3.5–5.5)
SODIUM SERPL-SCNC: 132 MMOL/L (ref 136–145)

## 2022-02-06 NOTE — NUR
PT MORE AWAKE THIS AM AT START OF SHIFT THAN YESTERDAY. HEPARIN STILL INFUSING
PER PHARMACY. PT UP INDEPENDENTLY IN , WANTING TO GO HOME. DR DECKER IN TO
SEE PT AND DISCUSSED PLAN OF CARE. PT TO D/C TO HOME AND START TAKING ELIQUIS
TONIGHT. MEDS FAXED TO [facility] PER PT REQUEST. PT D/C'D HIS OWN IV'S; SULTANA
AND RFA, LEAVING HEPARIN INFUSING ON BED. PT WANTING TO GO BEFORE D/C
COMPLETE. PER CHRG RN, PT WANTING TO GO USE METH. PT ENCOURAGED TO NOT USE
METH ANYMORE. D/C INSTRUCTIONS REVIEWED WITH PT, PT THEN TAKEN DOWN TO WAITING
FAMILY FOR RIDE HOME. PT BELONGINGS TAKEN BY PT.

## 2022-02-06 NOTE — NUR
SHIFT SUMMARY
A/O, ABLE TO MAKE NEEDS KNOWN. COOPERATIVE WITH CARE. CALLS AND ANSWERS
QUESTIONS APPROPRIATELY. NO C/O PAIN/DISCOMFORT. MEDICATED PER PATIENT REQUEST
FOR SLEEP. NO ACUTE CHANGES NOTED OVERNIGHT. APPEARED TO REST OFF AND ON. BED
REMAINS IN LOWEST POSITION. CALL LIGHT AND BELONGINGS WITHIN REACH. REPORT TO
ONCOMING RN.

## 2022-03-09 ENCOUNTER — HOSPITAL ENCOUNTER (INPATIENT)
Dept: HOSPITAL 95 - ER | Age: 57
LOS: 12 days | Discharge: HOME | DRG: 280 | End: 2022-03-21
Attending: INTERNAL MEDICINE | Admitting: HOSPITALIST
Payer: COMMERCIAL

## 2022-03-09 VITALS — BODY MASS INDEX: 23.24 KG/M2 | WEIGHT: 186.95 LBS | HEIGHT: 75 IN

## 2022-03-09 DIAGNOSIS — R57.0: ICD-10-CM

## 2022-03-09 DIAGNOSIS — Z79.01: ICD-10-CM

## 2022-03-09 DIAGNOSIS — Z72.89: ICD-10-CM

## 2022-03-09 DIAGNOSIS — E87.5: ICD-10-CM

## 2022-03-09 DIAGNOSIS — E21.3: ICD-10-CM

## 2022-03-09 DIAGNOSIS — N17.9: ICD-10-CM

## 2022-03-09 DIAGNOSIS — Z28.21: ICD-10-CM

## 2022-03-09 DIAGNOSIS — I26.99: ICD-10-CM

## 2022-03-09 DIAGNOSIS — N18.30: ICD-10-CM

## 2022-03-09 DIAGNOSIS — E83.42: ICD-10-CM

## 2022-03-09 DIAGNOSIS — E83.39: ICD-10-CM

## 2022-03-09 DIAGNOSIS — Z91.14: ICD-10-CM

## 2022-03-09 DIAGNOSIS — I21.A1: ICD-10-CM

## 2022-03-09 DIAGNOSIS — I25.10: ICD-10-CM

## 2022-03-09 DIAGNOSIS — Z90.49: ICD-10-CM

## 2022-03-09 DIAGNOSIS — E87.2: ICD-10-CM

## 2022-03-09 DIAGNOSIS — F41.9: ICD-10-CM

## 2022-03-09 DIAGNOSIS — I50.23: Primary | ICD-10-CM

## 2022-03-09 DIAGNOSIS — K72.00: ICD-10-CM

## 2022-03-09 DIAGNOSIS — Z87.891: ICD-10-CM

## 2022-03-09 DIAGNOSIS — E87.1: ICD-10-CM

## 2022-03-09 DIAGNOSIS — Z79.899: ICD-10-CM

## 2022-03-09 DIAGNOSIS — E87.6: ICD-10-CM

## 2022-03-09 DIAGNOSIS — I48.92: ICD-10-CM

## 2022-03-09 DIAGNOSIS — D63.1: ICD-10-CM

## 2022-03-09 DIAGNOSIS — Z98.890: ICD-10-CM

## 2022-03-09 DIAGNOSIS — I95.9: ICD-10-CM

## 2022-03-09 DIAGNOSIS — J96.01: ICD-10-CM

## 2022-03-09 LAB
ANION GAP SERPL CALCULATED.4IONS-SCNC: 8 MMOL/L (ref 6–16)
BASOPHILS # BLD AUTO: 0.04 K/MM3 (ref 0–0.23)
BASOPHILS NFR BLD AUTO: 0 % (ref 0–2)
BUN SERPL-MCNC: 31 MG/DL (ref 8–24)
CALCIUM SERPL-MCNC: 8.9 MG/DL (ref 8.5–10.1)
CHLORIDE SERPL-SCNC: 99 MMOL/L (ref 98–108)
CO2 SERPL-SCNC: 28 MMOL/L (ref 21–32)
CREAT SERPL-MCNC: 1.8 MG/DL (ref 0.6–1.2)
DEPRECATED RDW RBC AUTO: 57.1 FL (ref 35.1–46.3)
EOSINOPHIL # BLD AUTO: 0.03 K/MM3 (ref 0–0.68)
EOSINOPHIL NFR BLD AUTO: 0 % (ref 0–6)
ERYTHROCYTE [DISTWIDTH] IN BLOOD BY AUTOMATED COUNT: 19.7 % (ref 11.7–14.2)
GLUCOSE SERPL-MCNC: 151 MG/DL (ref 70–99)
HCT VFR BLD AUTO: 42.1 % (ref 37–53)
HGB BLD-MCNC: 12.8 G/DL (ref 13.5–17.5)
IMM GRANULOCYTES # BLD AUTO: 0.05 K/MM3 (ref 0–0.1)
IMM GRANULOCYTES NFR BLD AUTO: 1 % (ref 0–1)
LYMPHOCYTES # BLD AUTO: 1.64 K/MM3 (ref 0.84–5.2)
LYMPHOCYTES NFR BLD AUTO: 17 % (ref 21–46)
MCHC RBC AUTO-ENTMCNC: 30.4 G/DL (ref 31.5–36.5)
MCV RBC AUTO: 82 FL (ref 80–100)
MONOCYTES # BLD AUTO: 0.93 K/MM3 (ref 0.16–1.47)
MONOCYTES NFR BLD AUTO: 10 % (ref 4–13)
NEUTROPHILS # BLD AUTO: 7.15 K/MM3 (ref 1.96–9.15)
NEUTROPHILS NFR BLD AUTO: 73 % (ref 41–73)
NRBC # BLD AUTO: 0.02 K/MM3 (ref 0–0.02)
NRBC BLD AUTO-RTO: 0.2 /100 WBC (ref 0–0.2)
PLATELET # BLD AUTO: 323 K/MM3 (ref 150–400)
POTASSIUM SERPL-SCNC: 3.9 MMOL/L (ref 3.5–5.5)
SODIUM SERPL-SCNC: 135 MMOL/L (ref 136–145)

## 2022-03-09 PROCEDURE — C1751 CATH, INF, PER/CENT/MIDLINE: HCPCS

## 2022-03-09 PROCEDURE — C1752 CATH,HEMODIALYSIS,SHORT-TERM: HCPCS

## 2022-03-09 PROCEDURE — A9270 NON-COVERED ITEM OR SERVICE: HCPCS

## 2022-03-10 LAB
ALBUMIN SERPL BCP-MCNC: 2.9 G/DL (ref 3.4–5)
ALBUMIN/GLOB SERPL: 0.7 {RATIO} (ref 0.8–1.8)
ALT SERPL W P-5'-P-CCNC: 66 U/L (ref 12–78)
ANION GAP SERPL CALCULATED.4IONS-SCNC: 7 MMOL/L (ref 6–16)
AST SERPL W P-5'-P-CCNC: 53 U/L (ref 12–37)
BASOPHILS # BLD AUTO: 0.04 K/MM3 (ref 0–0.23)
BASOPHILS NFR BLD AUTO: 0 % (ref 0–2)
BILIRUB SERPL-MCNC: 1.9 MG/DL (ref 0.1–1)
BUN SERPL-MCNC: 29 MG/DL (ref 8–24)
CALCIUM SERPL-MCNC: 8.5 MG/DL (ref 8.5–10.1)
CANNABINOIDS UR QL: DETECTED
CHLORIDE SERPL-SCNC: 100 MMOL/L (ref 98–108)
CO2 SERPL-SCNC: 29 MMOL/L (ref 21–32)
CREAT SERPL-MCNC: 1.71 MG/DL (ref 0.6–1.2)
DEPRECATED RDW RBC AUTO: 55.9 FL (ref 35.1–46.3)
EOSINOPHIL # BLD AUTO: 0.03 K/MM3 (ref 0–0.68)
EOSINOPHIL NFR BLD AUTO: 0 % (ref 0–6)
ERYTHROCYTE [DISTWIDTH] IN BLOOD BY AUTOMATED COUNT: 19.5 % (ref 11.7–14.2)
GLOBULIN SER CALC-MCNC: 4.2 G/DL (ref 2.2–4)
GLUCOSE SERPL-MCNC: 139 MG/DL (ref 70–99)
HCT VFR BLD AUTO: 39.8 % (ref 37–53)
HGB BLD-MCNC: 12.5 G/DL (ref 13.5–17.5)
IMM GRANULOCYTES # BLD AUTO: 0.03 K/MM3 (ref 0–0.1)
IMM GRANULOCYTES NFR BLD AUTO: 0 % (ref 0–1)
LYMPHOCYTES # BLD AUTO: 1.71 K/MM3 (ref 0.84–5.2)
LYMPHOCYTES NFR BLD AUTO: 18 % (ref 21–46)
MCHC RBC AUTO-ENTMCNC: 31.4 G/DL (ref 31.5–36.5)
MCV RBC AUTO: 80 FL (ref 80–100)
MONOCYTES # BLD AUTO: 0.84 K/MM3 (ref 0.16–1.47)
MONOCYTES NFR BLD AUTO: 9 % (ref 4–13)
NEUTROPHILS # BLD AUTO: 7.02 K/MM3 (ref 1.96–9.15)
NEUTROPHILS NFR BLD AUTO: 73 % (ref 41–73)
NRBC # BLD AUTO: 0 K/MM3 (ref 0–0.02)
NRBC BLD AUTO-RTO: 0 /100 WBC (ref 0–0.2)
PLATELET # BLD AUTO: 275 K/MM3 (ref 150–400)
POTASSIUM SERPL-SCNC: 3.9 MMOL/L (ref 3.5–5.5)
PROT SERPL-MCNC: 7.1 G/DL (ref 6.4–8.2)
SODIUM SERPL-SCNC: 136 MMOL/L (ref 136–145)

## 2022-03-10 NOTE — NUR
SHIFT SUMMARY
PT REMAINED ALERT TO SELF, PLACE, AND SITUATION BUT DID NOT KNOW THE
DAY/TIME. HE REPORTED THAT HE "HADN'T SLEPT IN WEEKS AND LAST NIGHT WAS THE
FIRST GOOD NIGHT SLEEP HE'D HAD." HE WAS LETHARGIC FOR MAJORITY OF SHIFT BUT
WOULD WAKE EASILY TO VERBAL STIMULI. SPO2 MAINTAINED % VIA 1L O2, AT
APPROX 1700 O2 WAS REMOVED AND PATIENT SPO2 MAINTAINED AT 96% VIA ROOM AIR. HE
DENIED CHEST PAIN/PRESSURE T/O SHIFT BUT REPORTED FEELING SHORT OF BREATH WITH
EXERTION AND ALSO REPORTED EPISODES OF ANXIETY THAT WOULD "COME AND GO." DR. BADILLO MADE AWARE AND ORDERS FOR BUSPIRONE GIVEN, SEE EMAR. DEEP BREATHING
TECHNIQUE ALSO DISCUSSED WITH PATIENT AND REINFORCED DURING SHIFT. AFTER AM
DOSE OF LASIX, PATIENT HAD ONLY VOIDED 50ML WITH NO URGE TO VOID. PATIENT
DENIED FEELINGS OF BLADDER PRESSURE OR IRRITATION BUT THIS NURSE PERFORMED A
BLADDER SCAN WHICH SHOWED RETENTION OF 440ML. DR BADILLO MADE AWARE AT 1452,
NO NEW ORDERS GIVEN. AT 1621 PT VOIDED 200ML URINE. BLADDER SCAN PERFORMED
AGAIN AND ONLY SHOWED 77ML OF URINE IN BLADDER. 1800 DOSE OF LASIX GIVEN. WILL
PASS ON TO REPORT CONCERNS REGARDING RETENTION. TELE MONITORING IS IN PLACE
AND SHOWS PATIENT IS IN AFLUTTER 110'S. IV IN RIGHT FOREARM IS SALINE LOCKED.
HE CONTINUED TO UTILIZE BEDSIDE URINAL TO VOID. NO ACUTE CHANGES NOTED. WILL
CONTINUE TO MONITOR UNTIL REPORT GIVEN.

## 2022-03-10 NOTE — NUR
SHIFT SUMMARY
 
ASSUMED CARE OF PT AT 0425. BP STABLE. AFEBRILE. ON 2L SATS OVER 97%. HR
AFLUTTER 120'S. NO C/O PAIN OR DISCOMFORT. VOIDS INDEPENDENTLY WITH URINAL.
SBA FOR TRANSFERS- GAIT UNSTEADY ON WAY TO BED. HEP GTT INFUSING IN R FOREARM.
IN BED SLEEPING WITH CALL ALARM AT SIDE. WILL CONTINUE TO MONITOR UNTIL REPORT
GIVEN

## 2022-03-10 NOTE — NUR
AM NOTE
PT ALERT AND ORIENTED X 4. IN REPORT THIS NURSE WAS TOLD THAT PT APPEARED
UNSTEADY ON FEET, BED ALARM ON. HEPARIN DRIP GOING PER EMAR ORDERS AT
18UNITS/KG/HR. WILL CONTINUE TO MONITOR

## 2022-03-11 LAB
ANION GAP SERPL CALCULATED.4IONS-SCNC: 17 MMOL/L (ref 6–16)
BASOPHILS # BLD AUTO: 0.03 K/MM3 (ref 0–0.23)
BASOPHILS NFR BLD AUTO: 0 % (ref 0–2)
BILIRUB UR QL STRIP: (no result)
BUN SERPL-MCNC: 40 MG/DL (ref 8–24)
CALCIUM SERPL-MCNC: 9 MG/DL (ref 8.5–10.1)
CHLORIDE SERPL-SCNC: 95 MMOL/L (ref 98–108)
CO2 SERPL-SCNC: 20 MMOL/L (ref 21–32)
CREAT SERPL-MCNC: 2.49 MG/DL (ref 0.6–1.2)
DEPRECATED RDW RBC AUTO: 58.3 FL (ref 35.1–46.3)
EOSINOPHIL # BLD AUTO: 0.01 K/MM3 (ref 0–0.68)
EOSINOPHIL NFR BLD AUTO: 0 % (ref 0–6)
ERYTHROCYTE [DISTWIDTH] IN BLOOD BY AUTOMATED COUNT: 20 % (ref 11.7–14.2)
GLUCOSE SERPL-MCNC: 56 MG/DL (ref 70–99)
HCT VFR BLD AUTO: 46.8 % (ref 37–53)
HGB BLD-MCNC: 13.8 G/DL (ref 13.5–17.5)
IMM GRANULOCYTES # BLD AUTO: 0.1 K/MM3 (ref 0–0.1)
IMM GRANULOCYTES NFR BLD AUTO: 1 % (ref 0–1)
KETONES UR STRIP-MCNC: (no result) MG/DL
LEUKOCYTE ESTERASE UR QL STRIP: (no result)
LYMPHOCYTES # BLD AUTO: 1.91 K/MM3 (ref 0.84–5.2)
LYMPHOCYTES NFR BLD AUTO: 15 % (ref 21–46)
MCHC RBC AUTO-ENTMCNC: 29.5 G/DL (ref 31.5–36.5)
MCV RBC AUTO: 84 FL (ref 80–100)
MONOCYTES # BLD AUTO: 0.81 K/MM3 (ref 0.16–1.47)
MONOCYTES NFR BLD AUTO: 6 % (ref 4–13)
NEUTROPHILS # BLD AUTO: 10.15 K/MM3 (ref 1.96–9.15)
NEUTROPHILS NFR BLD AUTO: 78 % (ref 41–73)
NRBC # BLD AUTO: 0.08 K/MM3 (ref 0–0.02)
NRBC BLD AUTO-RTO: 0.6 /100 WBC (ref 0–0.2)
PH BLDA: 7.31 [PH] (ref 7.35–7.45)
PLATELET # BLD AUTO: 333 K/MM3 (ref 150–400)
POTASSIUM SERPL-SCNC: 5 MMOL/L (ref 3.5–5.5)
PROT UR STRIP-MCNC: (no result) MG/DL
RBC #/AREA URNS HPF: (no result) /HPF (ref 0–2)
SODIUM SERPL-SCNC: 132 MMOL/L (ref 136–145)
SP GR SPEC: 1.03 (ref 1–1.02)
TSH SERPL DL<=0.005 MIU/L-ACNC: 12.6 UIU/ML (ref 0.36–4.8)
URN SPEC COLLECT METH UR: (no result)
UROBILINOGEN UR STRIP-MCNC: (no result) MG/DL
WBC #/AREA URNS HPF: (no result) /HPF (ref 0–5)

## 2022-03-11 PROCEDURE — 3E033XZ INTRODUCTION OF VASOPRESSOR INTO PERIPHERAL VEIN, PERCUTANEOUS APPROACH: ICD-10-PCS | Performed by: INTERNAL MEDICINE

## 2022-03-11 PROCEDURE — 02HV33Z INSERTION OF INFUSION DEVICE INTO SUPERIOR VENA CAVA, PERCUTANEOUS APPROACH: ICD-10-PCS | Performed by: INTERNAL MEDICINE

## 2022-03-11 NOTE — NUR
CALL MADE TO DR. ACOSTA
BP'S HAVE BEEN LABILE WITH MOST RECENT BP'S SHOWING SBP 60-70'S WITH MAP'S
50'S. PT STILL AROUSABLE AND ALERT, BUT MUCH MORE LETHARGIC COMPARED TO
EARLIER. PT STILL REMAINS CYANOTIC; THEREFORE, UNSURE IF BP'S ARE ACCURATE.
ATTEMPTED MANUAL, BUT UNABLE TO HEAR BRACHIAL PULSE. THEREFORE, WAS ABLE TO
OBTAIN A DOPPLER BP OF 82/D. NEW ORDERS FOR LEVOPHED AND DOBUTAMINE, AS WELL
AS AN ART LINE PLACEMENT IF THERE WAS A PHYSICIAN AVAILABLE TO INITIATE ONE.
CALL MADE TO DR. PRETTY.

## 2022-03-11 NOTE — NUR
RAPID CALLED
 
THIS RN HEARD PATIENT GRUNTING FROM ROOM AND WENT IN TO FIND PATIENT LAYING ON
HIS STOMACH WITH HIS RIGHT LEG HANGING OUT OF BED. AT THE SAME TIME, THE
PERSON WHO CAME TO DO AN ECHO INFORMED THE CHARGE RN THAT THE PATIENT DIDN'T
LOOK LIKE HE FELT WELL. CHARGE RNRUFUS CAME IN RIGHT BEHIND ME, AND SO DID
BELIA RODRIGEUZ. PATIENT WAS IN RESPIRATORY DISTRESS AND NON RESPNSIVE, WHEN TURNED
BACK ONTO HIS BACKSIDE HIS FACE WAS BLUE AND NOT BREATHING, HIS EXTREMITIES
WERE BLUE, AS WELL AS HIS EARS. SEE VITAL SIGNS SECTION TO SEE VITAL SIGNS
TREND. IT WAS DIFFICULT TO GET A GOOD OXYGEN READING, THE BEST PLACE WE COULD
WAS THE EAR LOBE.  A RAPID WAS CALLED AT 1455.  THIS RN, LONA RN, AND RUFUS RODRIGUEZ
TRIED TO GET THE PATIENT TO RESPOND, AND HE WAS UNRESPONSIVE, A STERUM RUB WAS
DONE, AND OXYGEN INCREASED. PATIENT STARTED TO BREATH AND MOAN AND GROAN.
RESPIRATOY THERAPY INTO ROOM AND HAD A NON REBREATHER BROUGHT INTO THE ROOM
AND APPLIED TO THE PATIENT, SO PATIENT ON 15L NC AND 15L NRB. PATIENT STARTED
TO TURN LESS BLUE, BUT STILL HAD THE DUSKY APPERANCE AND WAS RESPONSIVE NOW
AND ASKED WHAT HAPPENED.  ICU CHARGE NURSE RONALDO INTO ROOM AND ASSITING WITH
CARE. ZORA, NURSING SUPERVISOR CALLED FAMILY AND CALLED MD BADILLO. THIS RN
STEPPED OUT AND INFORMED MD BADILLO OF THE SITUATION, AND A STAT
CHEST X RAY AND EKG WAS PUT IN AND DONE.  RONALDO RODRIGUEZ AND RUFUS RODRIGUEZ APPLIED THE EKG.
RESPIRATORY THERAPY SWITCHED PATIENT TO THE BIPAP. PATIENT BECAME MORE
RESPONSIVE AND ASKED WHAT HAPPENED. PATIENT EXTREMITIES STILL DUSKY/PURPLE IN
APPEREINCE AND COOL TO TOUCH. XRAY TECH DID CHEST XRAY. PATIENT
WAS TRANSFERD TO ICU AT 1510, WITH THIS RN, RUFUS RODRIGUEZ, RONALDO RN, AND RESPIRATORY
THERAPY.  PATIENT ON PORTABLE VITAL MACHINE MONITOR. PATIENT ARRIVED TO ICU
AND WAS TRANSFERED VIA SLIDER SHEET TO ICU BED. VITAL SIGNS WERE TAKEN, SEE
VITAL SIGNS TO SEE TRENDS.  REPORT WAS GIVEN TO ICU NURSE AT BEDSIDE. PATIENT
WAS RESPONSIVE, BUT FAINT RADIAL PULSES AND STILL PURPLE DISCOLORATION TO
EXTREMITIES AND EAR LOBES.  THIS RN CALLED MD BADILLO CALLED INFORMED OF
MOVING TO ICU, MD BADILLO WAS ON HIS WAY UP AND ARRIVED AS THIS RN WAS
LEAVING.

## 2022-03-11 NOTE — NUR
SHIFT SUMMARY
 
PT ALERT AND ORIENTED X3. DISORIENTED TO DATE. HR AFLUTTER 'S. ON 0-2L
NC SATS OVER 97%. LUNG SOUNDS CLEAR. AFEBRILE. BP STABLE. X2 ANXIETY ATTACKS
THROUGHOUT NIGHT. PT FEELS SOB AND IS DIAPHORETIC. RELIEVED WITH RN IN ROOM.
PT STATES THE ANXIETY ATTACKS HAPPEN AFTER HE MOVES AROUND. VITAL SIGNS REMAIN
STABLE DURING THESE ANXIETY EPISODES. PT IN BED SLEEPING WITH CALL ALARM AT
SIDE. WILL CONTINUE TO MONITOR UNTIL REPORT GIVEN TO DAYSHIFT RN

## 2022-03-11 NOTE — NUR
CRITICAL VALUE
 
THIS RN RECEIVED A CALL FROM [facility] AT 1230 WITH CRITICAL LACTIC AT 8.9. THIS RN
INFOMRED CHARGE RN, RUFUS, AT 1230. THIS RN INFORMED MD BADILLO AT 1233 OF
CRITICAL VALUE. THERE IS NO CHANGE IN PATIENT CONDITION. WILL CONTINUE TO
MONITOR AND PROVIDE CARE.

## 2022-03-11 NOTE — NUR
PT TRANSFERRED FROM PCU AROUND 1515. PT ARRIVES ON BIPAP, WITH GENERALIZED
MOTTLING. UNABLE TO GET AN ACCURATE O2 SAT READING DUE TO POOR PERFUSION. EARS
PURPLE, FEET AND TOES DARK PURPLE, MOTTLED TO MID SHIN. HANDS MOTTLED. PT
DROWSY BUT ROUSES TO VOICE AND IS ORIENTED TO PERSON, PLACE AND TIME. BEAR
HUGGER PLACED DUE TO HYPOTHERMIA. BIPAP SETTINGS NOW 16/8 90%. BP STABLE, SEE
VITALS. A FLUTTER RATE 70S-90S.
 
BRUCE PLACED. UA SENT.

## 2022-03-11 NOTE — NUR
DR. PRETTY AT BEDSIDE
ART LINE PLACEMENT TO RIGHT FEMORAL SITE. PT TOLERATED WELL. ART LINE ZERO'D
AT PHLEBOSTATIC AXIS. ART BP'S CORRELATING WITH NON-INVASIVE BP'S. MAP'S ARE
AT 65 MMHG; THEREFORE, LEVOPHED AND DOBUTAMINE NOT STARTED AT THIS TIME. WILL
CONTINUE TO REASSESS BP'S AS THE SHIFT CONTINUES.

## 2022-03-11 NOTE — NUR
CARE ASSUMPTION
 
PATIENT IS ALERT AND ORIENTED X3. PATIENT THOUGHT TODAY WAS WEDNESDAY INSTEAD
OF FRIDAY. PERRLA. NEURO IS INTACT OTHERWISE. VSS. TELE AFLUTTER 103. PATIENT
REPORTS NO CHEST PAIN/PRESSURE, OR PAIN. PATIENT DOES BECOME SHORT OF BREATH
WITH EXERTION. LUNG SOUNDS CLEAR/DIM. PATIENT HAS STRONG RADIAL AND FAINT
PEDIS PULSE. SEE SHIFT ASSESSMENT FOR FULL DETAILS. THIS RN PROVIDED
THERAPEUTIC COMMUNICATION AND ACTIVE LISTENING WHILE PATIENT EXPLAINED WHY HE
WAS IN THE HOSPITAL. THIS RN STRESSED THE IMPORTANCE OF MEDICATION COMPLIANCE,
WHICH PATIENT STATED HE NOTICED HIS WORSING OF SHORTNESS OF BREATH AFTER HE
STOPPED TAKING HIS MEDICATIONS DUE TO INSOMNIA. CALL LIGHT IS WITHIN REACH AND
BED IN LOWEST POSITION. WILL CONTINUE TO MONITOR AND PROVIDE CARE.

## 2022-03-11 NOTE — NUR
ASSUMED PT CARE AT 1915
PT RESTING IN BED ON BIPAP 16/8; FIO2 90%. OXYGEN SATURATION READING 30%;
HOWEVER, PT IS VERY CYANOTIC TO ALL EXTREMITIES, EARS, AND LIPS; VERY POOR
PERFUSION. PT IS IN AN AFLUTTER; 3:1 CONDUCTION WITH OCCASIONAL 2:1
CONDUCTION. RATE 70'S. BP'S LOW, BUT STABLE WITH MAP'S >65 MMHG. PT IS ALERT
AND ORIENTED AND ABLE TO MAKE NEEDS KNOWN. STATES HE DID NOT KNOW HE HAD HEART
FAILURE. STATES HE LIVES ALONE, BUT TAKES CARE OF HIS 90 YEAR OLD MOM WHO IS
CURRENTLY AT [facility] [place] D/T A BROKEN HIP. PT GAVE
ME PERMISSION TO CALL AND GIVE HER AN UPDATE REGARDING HIS CONDITION, AS WELL
AS TO CHECK IN ON HOW SHE IS DOING.
URINE OUTPUT HAS BEEN MINIMAL PER REPORT; BRUCE CATHETER IS PATENT AND
DRAINING DARK, YELLOW URINE TO GRAVITY. TEMP IS 96; LATISHA HUGGER IN PLACE. PT
ON BED PAN X2; STATES HE HAS BEEN HAVING DIARRHEA THE LAST COUPLE OF DAYS.
HOWEVER, PT HAS NOT PRODUCED ANY STOOL, JUST GAS. DOES NOT TOLERATE LYING FLAT
WHEN PLACING ON BED PAN. BECOMES VERY SOB WITH HEAD BECOMING MORE CYANOTIC
THAN BEFORE; THEREFORE, PT ONLY LOWERED TO 30 DEGREES SECOND TIME HE WAS
PLACED ON BED PAN.
CARDIOLOGY CONSULTED, BUT HAS NOT SEEN PT YET. ORDERS FOR HEPARIN GTT TO
START AT 2000. WILL CONTINUE TO KEEP AN EYE OUT FOR CARDIOLOGY TO STOP BY AND
SEE PT.
CALL LIGHT WITHIN REACH; SEE SHIFT SUMMARY FOR FURTHER DETAILS.

## 2022-03-12 LAB
ALBUMIN SERPL BCP-MCNC: 3 G/DL (ref 3.4–5)
ALBUMIN/GLOB SERPL: 0.7 {RATIO} (ref 0.8–1.8)
ANION GAP SERPL CALCULATED.4IONS-SCNC: 23 MMOL/L (ref 6–16)
ANION GAP SERPL CALCULATED.4IONS-SCNC: 24 MMOL/L (ref 6–16)
BASOPHILS # BLD AUTO: 0.03 K/MM3 (ref 0–0.23)
BASOPHILS NFR BLD AUTO: 0 % (ref 0–2)
BILIRUB SERPL-MCNC: 3.9 MG/DL (ref 0.1–1)
BUN SERPL-MCNC: 62 MG/DL (ref 8–24)
BUN SERPL-MCNC: 67 MG/DL (ref 8–24)
CALCIUM SERPL-MCNC: 7.3 MG/DL (ref 8.5–10.1)
CALCIUM SERPL-MCNC: 8.3 MG/DL (ref 8.5–10.1)
CHLORIDE SERPL-SCNC: 90 MMOL/L (ref 98–108)
CHLORIDE SERPL-SCNC: 92 MMOL/L (ref 98–108)
CO2 SERPL-SCNC: 14 MMOL/L (ref 21–32)
CO2 SERPL-SCNC: 9 MMOL/L (ref 21–32)
CREAT SERPL-MCNC: 3.84 MG/DL (ref 0.6–1.2)
CREAT SERPL-MCNC: 4.37 MG/DL (ref 0.6–1.2)
DEPRECATED RDW RBC AUTO: 56 FL (ref 35.1–46.3)
EOSINOPHIL # BLD AUTO: 0.01 K/MM3 (ref 0–0.68)
EOSINOPHIL NFR BLD AUTO: 0 % (ref 0–6)
ERYTHROCYTE [DISTWIDTH] IN BLOOD BY AUTOMATED COUNT: 19.3 % (ref 11.7–14.2)
GLOBULIN SER CALC-MCNC: 4.3 G/DL (ref 2.2–4)
GLUCOSE SERPL-MCNC: 139 MG/DL (ref 70–99)
GLUCOSE SERPL-MCNC: 147 MG/DL (ref 70–99)
HCT VFR BLD AUTO: 41.4 % (ref 37–53)
HGB BLD-MCNC: 12.6 G/DL (ref 13.5–17.5)
IMM GRANULOCYTES # BLD AUTO: 0.13 K/MM3 (ref 0–0.1)
IMM GRANULOCYTES NFR BLD AUTO: 1 % (ref 0–1)
LYMPHOCYTES # BLD AUTO: 2.22 K/MM3 (ref 0.84–5.2)
LYMPHOCYTES NFR BLD AUTO: 13 % (ref 21–46)
MCHC RBC AUTO-ENTMCNC: 30.4 G/DL (ref 31.5–36.5)
MCV RBC AUTO: 81 FL (ref 80–100)
MONOCYTES # BLD AUTO: 1.19 K/MM3 (ref 0.16–1.47)
MONOCYTES NFR BLD AUTO: 7 % (ref 4–13)
NEUTROPHILS # BLD AUTO: 13.25 K/MM3 (ref 1.96–9.15)
NEUTROPHILS NFR BLD AUTO: 79 % (ref 41–73)
NRBC # BLD AUTO: 0.08 K/MM3 (ref 0–0.02)
NRBC BLD AUTO-RTO: 0.5 /100 WBC (ref 0–0.2)
PH BLDA: 7.33 [PH] (ref 7.35–7.45)
PH BLDA: 7.35 [PH] (ref 7.35–7.45)
PLATELET # BLD AUTO: 310 K/MM3 (ref 150–400)
POTASSIUM SERPL-SCNC: 5.6 MMOL/L (ref 3.5–5.5)
POTASSIUM SERPL-SCNC: 5.9 MMOL/L (ref 3.5–5.5)
PROT SERPL-MCNC: 7.3 G/DL (ref 6.4–8.2)
SODIUM SERPL-SCNC: 122 MMOL/L (ref 136–145)
SODIUM SERPL-SCNC: 130 MMOL/L (ref 136–145)

## 2022-03-12 PROCEDURE — B548ZZA ULTRASONOGRAPHY OF SUPERIOR VENA CAVA, GUIDANCE: ICD-10-PCS | Performed by: INTERNAL MEDICINE

## 2022-03-12 PROCEDURE — 02HV33Z INSERTION OF INFUSION DEVICE INTO SUPERIOR VENA CAVA, PERCUTANEOUS APPROACH: ICD-10-PCS | Performed by: INTERNAL MEDICINE

## 2022-03-12 NOTE — NUR
ASSUMED PT CARE AT 1915 FROM [facility]
PT RESTING IN BED WITH CPAP IN PLACE; IPAP 12, FIO2 50%, , RATE 20'S. PT
REMAINS AFLUTTER WITH RATE 70-80'S; BP'S STABLE WITH DOBUTAMINE AT
2MCG/KG/MIN, LEVOPHED AT 20MCG/MIN. HEPARIN GTT ON STANDBY D/T DIALYSIS
CATHETER PLACEMENT NEEDING TO BE PERFORMED THIS SHIFT. BICARB GTT AT 100ML/HR.
PT REMAINS ALERT AND ORIENTED AND ABLE TO MAKE HIS NEEDS KNOWN. PT REMAINS
CYANOTIC TO PERIPHERAL EXTREMITIES WITH INACCURATE SPO2 READING D/T POOR
PERFUSION; HOWEVER, PER ABG RESULTS PO2 IS ADEQUATE. TEMP BRUCE CATHETER IS
PATENT AND DRAINING MINIMAL TO NO URINE OUTPUT. GOAL IS FOR DIALYSIS TREATMENT
TONGHT. CALL LIGHT IS WITHIN REACH; PT IS ABLE TO MAKE NEEDS KNOWN.

## 2022-03-12 NOTE — NUR
PT'S SPO2 MONITOR HAS BEEN READING IN THE 50S-70S WITH A GOOD PLETH. PT'S
COLOR REMAINS GOOD, HE IS ALERT AND ORIENTED. MULTIPLE SITES ATTEMPTED FOR
BETTER READINGS, BUT ALL READ LOW. SPOKE WITH DR. BADILLO WHO AGREED TO ABG
TO CONFIRM THAT PT IS STILL OXYGENATING WELL DESPITE SPO2 NOT READING AND
OXYGENATION IS GOOD PER ABG RESULTS. ALRM FOR SPO2 MONITOR EVNTUALLY TURNED
OFF AFTER NO SUCCESS GETTING IT TO READ CORRECTLY BECAUSE OF CONCERN FOR ALARM
FATIGUE AS MONITOR CONSTANTLY ALARMING. READING IS STILL VISIBLE ON
MONITOR. DR. DIAMOND CAME BY AND SAW PT. CONSULTED DR. CABRAL WHOM HE SPOKE
TO. DR. CABRAL CALLED AND GAVE ORDER FOR LOKELMA, CHEM 8 AND FLUID RESTRICTION.

## 2022-03-12 NOTE — NUR
END OF SHIFT SUMMARY
PT REMAINED OFF PRESSORS/INOTROPES THIS SHIFT. ONCE ART LINE WAS IN PLACE
MAP'S REMAINED AT 65MMHG. PT STILL BECOMES SOB AT TIMES AND WILL REQUIRE
BIPAP; 16/8; FIO2 65%, RR 20'S. LUNG SOUNDS REMAIN CLEAR. PT HAS BEEN NOTED TO
BE IN AND OUT OF AFLUTTER TO SINUS TACH; RATE CURRENTLY . HEPARIN
REMAINS AT 15 UNITS/KG/HR. PERIPHERAL EXTREMITIES, EARS, AND LIPS REMAIN
CYANOTIC. PULSES OBTAINED VIA DOPPLER. PT HAS HAD MULTIPLE LOOSE BM'S THIS
SHIFT. MINIMAL URINE OUTPUT 30CC THIS SHIFT; DR. ACOSTA INFORMED. FLUSHED
CATHETER AS PT STATED HE FELT THE URGE TO PEE AND COULDN'T; HOWEVER, THIS WAS
UNEFFECTIVE. BLADDER SCANNED WITH MINIMAL TO NO URINE IN BLADDER. PT VERY
THIRSTY THIS SHIFT AND CONTINUALLY ASKED FOR SPRITE. LIMITED FLUID INTAKE D/T
HEART FAILURE AND PT AGREED TO ICE CHIPS. PT ABLE TO MAKE NEEDS KNOWN; CALL
LIGHT IS WITHIN REACH. WILL CONTINUE TO MONITOR UNTIL REPORT IS HANDED OFF TO
ONCOMING RN.

## 2022-03-12 NOTE — NUR
REASSESSMENT
PT HAS BEEN RESTING IN BED THROUGHOUT THE MORNING. HE HAD A 30 MIN BREAK OFF
THE BIPAP THIS MORNING BEFORE HE GOT DYSPNEIC AND NEEDED IT BACK ON. LATER
THIS MORNING WHEN HE TOOK HIS PILLS HE ONLY TOLERATED 5 MINUTES OFF THE BIPAP.
DR. PERKINS CAME BY AND GAVE INSTRUCTIONS TO START THE LEVOPHED AND DOBUTAMINE
DESPITE MAP BEING 65. PT'S LUNGS HAVE A FEW CRACKLES AT THE BASES. SR WITH PAC
AND PVC. 1+ PITTING EDEMA IN THE LEGS. SKIN COLOR IS STILL DUSKY IN THE
EXTREMITIES. BRUCE WITH ONLY 30ML OF DARK CLOUDY URINE OUT THIS AM. PT'S
BROTHER VISITED AND WAS UPDATED BY CHARGE RN. CONTINUING TO  MONITOR.

## 2022-03-12 NOTE — NUR
THE BRIDGE OF PT'S NOSE IS STARTING TO LOOK PINK, BLANCHABLE. TRIED PLACING
GEL CUSHION UNDERNEATH BIPAP BUT PT DID NOT LIKE IT AS IT CREATED A SMALL AIR
LEAK BY HIS EYE THAT WE COULDN'T FIX DESPITE READJUSTING MASK. PT REQUESTED
NOT TO USE THE GEL CUSHION. MASK PUT BACK ON, ATTEMPTED TO AVOID THE PINK AREA
AS BEST AS POSSIBLE AND GIVING PT BREAKS FROM MASK AS ABLE TO PROTECT SKIN.

## 2022-03-12 NOTE — NUR
SHIFT SUMMARY
PT CONTINUES TO BE ALERT AND ORIENTED, REQUIRING BIPAP FOR DYSPNEA AND OVERALL
REMAINS DUSKY IN COLOR. LEVOPHED AND DOBUTAMINE INFUSING, MAP CURRENTLY 67.
AFLUTTER. LUNGS HAVE CRACKLES IN THE BASES. PT TOTALED 30 MINUTES AT MOST OFF
THE BIPAP TODAY BEFORE GETTING DYSPNEIC. HE WAS ABLE TO EAT A FEW SMALL SNACKS
DURING THOSE BREAKS. PT GOT ON THE BIPAP SEVERAL TIMES SAYING HE FELT LIKE HE
NEEDED TO HAVE A BM, BUT ONLY HAD MUCOUSY SMEARS. BRUCE WITH SCANT OUTPUT.
CONTINUING TO MONITOR.

## 2022-03-12 NOTE — NUR
DR. PERKINS CALLED AND UPDATED ON PT'S INCREASING NEED FOR PRESSORS AS WELL AS
CONVERSION INTO AFLUTTER, RATE IN THE 70S AND 80S.  CURRENTLY PT IS RECEIVING
2MCG/KG/MIN AND LEVOPHED AT 18 MCG/MIN. DR. PERKINS ADVISED LEAVING ATHE
DOBUTAMINE AT 2, INCREASE THE LEVOPHED TO 20 WITH HOPES THAT MAP WILL STAY
BETWEEN 60 AND 65. CONTINUE TO MONITOR AT THIS POINT.

## 2022-03-13 LAB
ALBUMIN SERPL BCP-MCNC: 2.5 G/DL (ref 3.4–5)
ANION GAP SERPL CALCULATED.4IONS-SCNC: 16 MMOL/L (ref 6–16)
BUN SERPL-MCNC: 51 MG/DL (ref 8–24)
CALCIUM SERPL-MCNC: 7 MG/DL (ref 8.5–10.1)
CHLORIDE SERPL-SCNC: 92 MMOL/L (ref 98–108)
CO2 SERPL-SCNC: 22 MMOL/L (ref 21–32)
CREAT SERPL-MCNC: 3.55 MG/DL (ref 0.6–1.2)
GLUCOSE SERPL-MCNC: 228 MG/DL (ref 70–99)
HCT VFR BLD AUTO: 36.3 % (ref 37–53)
HGB BLD-MCNC: 11.5 G/DL (ref 13.5–17.5)
MAGNESIUM SERPL-MCNC: 2.1 MG/DL (ref 1.6–2.4)
PHOSPHATE SERPL-MCNC: 6.9 MG/DL (ref 2.5–4.9)
POTASSIUM SERPL-SCNC: 3.9 MMOL/L (ref 3.5–5.5)
SODIUM SERPL-SCNC: 130 MMOL/L (ref 136–145)

## 2022-03-13 NOTE — NUR
END OF SHIFT SUMMARY
PT HAS BEEN SLEEPING MOST OF SHIFT WITH CPAP IN PLACE; IPAP 12; FIO2 35%. RR
20'S; SPO2 100% WHEN ABLE TO GET A READING. PT REMAINS DUSKY AND CYANOTIC WITH
VERY FAINT PULSES OBTAINED VIA DOPPLER. LUNG SOUNDS REMAIN CLEAR WITH
OCCASIONAL RHONCHI. PT NOTED TO BE AFLUTTER MOST OF NIGHT, BUT ALSO NOTED TO
BE IN SINUS TACHYCARDIA WITH RATE 'S. ART LINE REMAINS TO RIGHT FEMORAL
ARTERY. DOBUTAMINE REMAINS AT 2MCG/KG/MIN AND LEVOPHED AT 6MCG/MIN. HEPARIN AT
5 UNITS/KG/HR, AND BICARB AT 100ML/HR. PICC TO SULTANA. TRIALYSIS CATH TO RIGHT
IJ; PT WAS DIALYZED WITH 2L REMOVED THIS SHIFT. ABDOMEN REMAINS DISTENDED,
FIRM, AND TENDER UPON PALPATION; NO BM'S THIS SHIFT. PT REQUIRES SIGNIFICANT
EDUCATION REGARDING DISEASE PROCESS. HE HAS BEEN ENCOURAGED TO REPOSITION, AS
WELL AS PARTICIPATE IN ORAL CARE; HOWEVER, HE CAN BE RESISTANT AT TIMES.
WILL CONTINUE TO MONITOR UNTIL REPORT IS HANDED OFF TO ONCOMING RN.

## 2022-03-13 NOTE — NUR
REASSESSMENT
PT HAS CONTINUED ON THE BIPAP SINCE HIS EPISODE THIS MORNING. HE OPENS EYES TO
PAINFUL SITMULI. WHEN HE OPENS HIS EYES HE SAYS "YES" REPEATEDLY, BUT WON'T
ANSWER QUESTIONS AND WON'T FOLLOW COMMANDS. HIS LUNGS ARE CLEAR BUT VERY DIM.
SINUS TACH WITH RATE IN TEENS. BP WITH MAP IN THE 80S, TITRATING LEVOPHED DOWN
AS ABLE, DOBUTAMINE STILL INFUSING. BICARB GTT STOPPED PER DR. CABRAL. BRUCE
WITH 700ML OF CLOUDY URINE OUT.

## 2022-03-13 NOTE — NUR
LEVOPHED INCREASED FOR ANTICIPATION OF DIALYSIS AND THEN ONCE DIALYSIS STARTED
GOING BP DROPPED AGAIN SO LEVOPHED INCREASED MORE. AT THAT DR. MAHMOOD CAME IN
TO SEE PT ON MORNING ROUND AND PT ABRUPTLY STIFFENED, TURNED PURPLE, PUPILS
DILATED TO ABOUT 7MM, UNRESPONSIVE, BREATHING BUT IRREGULAR. DR. MAHMOOD
ORDERED 2MG ATIVAN STAT. ATIVAN GIVEN, RT AND CHARGE RN TO BEDSIDE. AFTER
ABOUT 1MINUTE PT STARTED TO RECOVER. REMAINS UNRESPONSIVE, BUT COLOR IMPROVED,
PUPILS BACK TO NORMAL, PT PLACED ON BIPAP AND BREATHING REGULARLY WITH THAT,
BP IMPROVED WITH INCREASE IN LEVOPHED. DR. MAHMOOD ORDERED FOR HEAD CT, NO
CONTRAST AND EEG MONDAY. DR. DECKER CAME TO THE BEDSIDE AND UPDATED ON EVENTS
OF THE MORNING. MARK RESUMED DIALYSIS AND PT CURRENTLY TOLERATING, SLEEPING ON
THE BIPAP.

## 2022-03-13 NOTE — NUR
SHIFT SUMMARY
PT HAS BEEN LETHARGIC SINCE HIS SEIZURE LIKE EPISODE THIS MORNING, BUT HIS
RESPONSIVENESS HAS BEEN SLOWLY INCREASING THROUGHOUT THE AFTERNOON. HE NOW
WILL OPEN HIS EYES TO VOICE AND FOLLOW SIMPLE COMMANDS. HE IS NOT MAKING ANY
VERBAL RESPONSES YET. HIS LUNGS ARE CLEAR, BUT DIM. HE WORE BIPAP MOST OF THE
DAY, BUT IS CURRENTLY ON A BREAK ON 4L/NC AND DOING WELL. HE REMAINS ON
LEVOPHED AND DOBUTAMINE. AT ONE POINT TODAY LEVOPHED WAS ACTUALLY OFF, BUT
PT'S BP DIDN'T HOLD AND IT HAD TO BE RESTARTED. HEPARIN INFUSING PER PHARMACY.
PT TOLERATED DIALYSIS. HE HAD GOOD URINE OUTPUT THIS SHIFT, SEE I/O. PASSING
GAS, BUT NO BM THIS SHIFT. CONTINUING TO MONITOR.

## 2022-03-13 NOTE — NUR
ASSUMED PT CARE FROM [facility] AT 1915
PT SLEEPING IN BED. AROUSABLE TO VERBAL STIMULI. VERY LETHARGIC AND DROWSY.
WILL ATTEMPT TO ANSWER QUESTIONS, BUT GENERALLY FALLS ASLEEP AFTER 1-2 WORD
RESPONSES. PER REPORT, PT HAD WHAT APPEARED TO BE SEIZURE LIKE ACTIVITY
EARLIER TODAY AND HAS BEEN POST-ICTAL SINCE. DOBUTAMINE REMAINS AT 2MCG/KG/MIN
AND LEVOPHED AT 14MCG/MIN WITH STABLE BP'S, SEE FLOWSHEET. HEPARIN GTT REMAINS
AT 5.5 UNITS/KG/HR. PT NOTED TO BE SINUS TACHYCARDIA WITH A RATE . ART
LINE REMAINS IN PLACE TO RIGHT GROIN; LINE ZERO'D AT START OF SHIFT WITH
DICROTIC NOTCH, AND GOOD PLETH NOTED TO WAVEFORM. PT CURRENTLY ON 4L NC. LUNG
SOUNDS CLEAR/DIMINISHED TO BASES.  EXTREMITIES REMAIN DUSKY, BUT PULSES ARE
MORE PALPABLE THAN YESTERDAY. CAP REFILL REMAINS >3SEC. +1 PITTING TO BLE'S,
DEPENDENT SACRAL, SCROTAL, AND BUE'S EDEMA. PER REPORT, PT RECEIVED DIALYSIS
EARLIER TODAY WITH 2.5L REMOVED. TEMP BRUCE REMAINS PATENT AND DRAINING A GOOD
AMOUNT OF DARK, YELLOR URINE TO GRAVITY. HELD EVENING MEDS D/T ASPIRATION
RISK.
SEE SHIFT SUMMARY FOR FURTHER DETAILS.

## 2022-03-14 LAB
ALBUMIN SERPL BCP-MCNC: 2.2 G/DL (ref 3.4–5)
ANION GAP SERPL CALCULATED.4IONS-SCNC: 9 MMOL/L (ref 6–16)
BUN SERPL-MCNC: 42 MG/DL (ref 8–24)
CALCIUM SERPL-MCNC: 7.7 MG/DL (ref 8.5–10.1)
CHLORIDE SERPL-SCNC: 94 MMOL/L (ref 98–108)
CO2 SERPL-SCNC: 33 MMOL/L (ref 21–32)
CREAT SERPL-MCNC: 2.75 MG/DL (ref 0.6–1.2)
GLUCOSE SERPL-MCNC: 187 MG/DL (ref 70–99)
HCT VFR BLD AUTO: 38.3 % (ref 37–53)
HEP C VIRUS AB: 0.1 (ref 0–0.9)
HGB BLD-MCNC: 12.5 G/DL (ref 13.5–17.5)
MAGNESIUM SERPL-MCNC: 1.9 MG/DL (ref 1.6–2.4)
PHOSPHATE SERPL-MCNC: 2.9 MG/DL (ref 2.5–4.9)
POTASSIUM SERPL-SCNC: 3.1 MMOL/L (ref 3.5–5.5)
SODIUM SERPL-SCNC: 136 MMOL/L (ref 136–145)

## 2022-03-14 NOTE — NUR
ASSUMPTION OF CARE
RECEIVED REPORT FROM JAMIA RODRIGUEZ. ASSUMED CARE OF PATIENT. PATIENT IN BED, EYES
CLOSED, RESPONDED TO VERBAL STIMULI, A/O. VITALS STABLE WITH 02 SATS AT 95% ON
RA, MAP ABOVE 65 WITH DOBUTAMINE AT 2MCG/KG/MIN AND LEVOPHED AT 8 MCG/MIN.
HEPARIN INFUSING AT 5.5UNITS/HR, POTASSIUM BEING REPLACED IV. PICC LINE TO RUE
DRESSING CDI. ART LINE TO RIGHT GROIN, DRESSING CDI. BRUCE CATHETER PATENT AND
DRAINING CLEAR, YELLOW URINE. WILL REVIEW ORDERS AND TREAT AS PRESCRIBED.

## 2022-03-14 NOTE — NUR
END OF SHIFT SUMMARY
PT HAS REMAINED VERY DROWSY AND SLEEPING MOST OF SHIFT. ABLE TO WAKE UP AND IS
AOX4; HOWEVER, FALLS BACK TO SLEEP QUICKLY. HELD ALL PO MEDS D/T ASPIRATION
RISK. RIGHT TRIALYSIS CATHETER SITE REMAINS STABLE WITH NO FURTHER OOZING.
PRESSURE GAUZE AND TRANSPARENT DRESSING REMAIN INTACT; HEMATOMA HAS MOSTLY
RESOLVED. PT HAD COPIOUS AMOUNTS OF URINE OUTPUT THIS SHIFT; 4450CC.
DOBUTAMINE CONTINUES AT 2MCG/KG/MIN AND LEVOPHED IS NOW AT 8MCG/MIN WITH MAP'S
>65 MMHG. HEPARIN CONTINUES AT 5.5 UNITS/KG/HR. ART LINE TO RIGHT GROIN SITE
REMAINS UNCHANGED. PICC TO RIGHT UPPER ARM. PT HAS BEEN ON ROOM AIR MOST OF
NIGHT WITH OXYGEN SATURATIONS REMAINING >90% WHEN ABLE TO OBTAIN SATURATION
READING. PT REMAINS AFLUTTER WITH RATE 120'S; 2:1 CONDUCTION AND OCCASIONAL
3:1 CONDUCTION. WILL CONTINUE TO MONITOR UNTIL REPORT IS HANDED OFF TO
ONCOMING RN.

## 2022-03-14 NOTE — NUR
ASSUMED CARE:
AT CHANGE OF SHIFT, PT A/O X 4, TAM, NAD, NO COMPLAINTS OF PAIN.
AFLUTTER, NOREPI @ 4, DOBUTAMINE @ 1, HEPARIN GTT @ 6.5. FAINT BUT PALPABLE
PULSES THROUGHOUT. POOR CAP REFILL. ART LINE R FEMORAL CDI.
LUNGS CLEAR, 2L O2 NC.
ABD SOFT, NONTENDER.
BRUCE DRAINING CLEAR YELLOW URINE.
HEMATOMA TO R NECK AT PREVIOUS TUNNELED CATH SITE W/ SCANT DRAINAGE TO
DRESSING.
SULTANA PICC CDI, INFUSING.

## 2022-03-14 NOTE — NUR
DR. CABRAL AT BEDSIDE
INFORMED OF PT PULLING OUT TRIALYSIS CATHETER WITH URINE OUTPUT >4000. NEW
ORDERS FOR NO NEW CATHETER PLACEMENT AND HE WILL CONTINUE MONITORING KIDNEY
FUNCTION AND URINE OUTPUT

## 2022-03-14 NOTE — NUR
SHIFT SUMMARY
PATIENT A/O THROUGH OUT SHIFT. INDEPENDENTLY FEEDS AND TURNS SELF WITH VERBAL
PROMPTING. ON RA WITH O2 SATS ABOVE 95%, PLACED 2L O2 VIA NC AFTER DINNER ONCE
PATIENT ONCE SLEEPING FOR 02 85%. FOREHEAD PROBE UTILIZED DUE TO POOR
PERFUSION TO EXTREMITIES.
EEG PERFORMED, AWAITING RESULTS. POTASSIUM RECHECKED PER ECTOPY AND INCREASED
URINE OUTPUT NOTED, REPLACEMENT GIVEN AS ORDERED BY DR. CABRAL. REVIEWED HEART
RATE AND BLOOD PRESSURE WITH CARDIOLOGIST DR. PERKINS. INCREASED METOPROLOL
DOSE AND DECREASED LEVOPHED KEEPING MAPS ABOVE 60. HEPARIN INCREASED ONE TIME
PER PHARMACY WITH A PTT RECHECK AS ORDERED. WILL CONTINUE TO MONITOR AND
REPORT TO ONCOMING RN.

## 2022-03-14 NOTE — NUR
PT PULLED OUT TRIALYSIS CATHETER
HEPARIN PLACED ON STANDBY FOR THE TIME BEING D/T HALF DOLLAR SIZE HEMATOMA
FORMING, AS WELL AS SITE CONTINUING TO OOZE. ORDERS TO RESTART ONCE OOZING
CEASES. PT ALERT AND ORIENTED; PT SLEEPING WITH HAND UP NEAR HEAD AND MUST
HAVE ACCIDENTLY PULLED OUT THE CATHETER. DR. MAHMOOD TO SPEAK WITH DR. CABRAL IN
THE MORNING REGARDING THE PLACEMENT OF ANOTHER CATHETER.

## 2022-03-14 NOTE — NUR
EEG
EEG PERFORMED FROM 1020 TO 1145. UNABLE TO MONITOR 02 SATS DURING THIS TIME.
PATIENT REMAINED STABLE WITH NO ACUTE CHANGES. SPO2 PER FOREHEAD READING IS
94% ON ROOM AIR.

## 2022-03-14 NOTE — NUR
HEART RATE
AT 1330 DR. PERKINS ROUNDED ON PATIENT, COMMUNICATED HEART RATE GOAL TO BE
BELOW 100 PREFERABLY 90'S. LEVOPHED DECREASED TO MAINTAIN A MAP OF 60. AT 1500
PATIENT'S HEART RATE STILL 110-120'S. RECEIVED ORDERS TO INCREASE METOPROLOL
PO. WILL TREAT AS PRESCRIBED.

## 2022-03-14 NOTE — NUR
THIS RN ENTERED PT'S ROOM TO ASSESS PT AS HE WAS BECOMING HYPOTENSIVE W/ MAPS
DOWN TO 57. UPON ENTERING ROOM, I FOUND THE PATIENT SATURATED WITH BLOOD AND
HIS TRIALYSIS CATHETER LAYING ON HIS CHEST. PRESSURE HELD ON SITE UNTIL
HEMOSTASIS ACHIEVED, THEN DRESSING APPLIED. HEMATOMA VISIBLE.
PT STATED HE HAD PULLED OUT HIS TRIALYSIS CATHETER IN HIS SLEEP. NOREPI
TEMPORARILY INCREASED TO 14 TO RECOVER BP. PT CLEANED & REPOSITIONED, NOREPI
TURNED BACK TO 12. TRIALYSIS SITE W/ SCANT OOZING VISIBLE ON DRESSING. PT'S RN
& CHARGE RN NOTIFIED.

## 2022-03-15 LAB
ALBUMIN SERPL BCP-MCNC: 2 G/DL (ref 3.4–5)
ANION GAP SERPL CALCULATED.4IONS-SCNC: 5 MMOL/L (ref 6–16)
BUN SERPL-MCNC: 41 MG/DL (ref 8–24)
CALCIUM SERPL-MCNC: 7.4 MG/DL (ref 8.5–10.1)
CHLORIDE SERPL-SCNC: 86 MMOL/L (ref 98–108)
CO2 SERPL-SCNC: 43 MMOL/L (ref 21–32)
CREAT SERPL-MCNC: 2.21 MG/DL (ref 0.6–1.2)
GLUCOSE SERPL-MCNC: 137 MG/DL (ref 70–99)
MAGNESIUM SERPL-MCNC: 1.3 MG/DL (ref 1.6–2.4)
MAGNESIUM SERPL-MCNC: 1.7 MG/DL (ref 1.6–2.4)
PHOSPHATE SERPL-MCNC: 2.5 MG/DL (ref 2.5–4.9)
PHOSPHATE SERPL-MCNC: 3.3 MG/DL (ref 2.5–4.9)
POTASSIUM SERPL-SCNC: 2.5 MMOL/L (ref 3.5–5.5)
POTASSIUM SERPL-SCNC: 2.8 MMOL/L (ref 3.5–5.5)
SODIUM SERPL-SCNC: 134 MMOL/L (ref 136–145)

## 2022-03-15 NOTE — NUR
ASSUMED CARE:
PT A/O, NAD, TAM. RESTING COMFORTABLY. NO C/O PAIN.
AFLUTTER W/ HR 110S-120S. BP WNL. R FEMORAL ART LINE CDI. PALPABLE PULSES
THROUGHOUT. AFEBRILE
2L O2 NC. CLEAR LUNGS.
BRUCE IN PLACE W/ GOOD URINE RETURN.
NO SKIN ISSUES. NO CONSTIPATION. GOOD PO INTAKE- 1L FR ALREADY MET FOR THE DAY
SO WILL NEED TO GO SLIGHTLY OVER FOR PM & EARLY AM MEDS.

## 2022-03-15 NOTE — NUR
ASSUMED CARE
REPORT FROM MAXIMINO AT 0700. PT RESTING IN BED. WAKES c VERBAL STIMULI. DENIES
CHEST PAIN, SOB OR OTHER COMPLAINTS. SPEAKING IN FULL SENTANCES. LUNGS CLEAR.
2L VIA NC. O2 SATS >95%. NON PRODUCTIVE COUGH OCCASIONALLY. DOBUTAMINE AT SET
RATE OF 2 MCG/KG/MIN, LEVO ON STANDBY. MAP >60. AFLUTTER, RATE 100'S. GOAL
>90, METOPROLOL GIVEN. PERIPHERAL PULSES FAINT BY PALPABLE, DELAYED CAP
REFILL. EXT DUSKY. ART LINE TO RIGHT GROIN, DRESSING C/D/I. FLUSHED AND
ZERO'D. ABD ROUND, SOFT, NON TENDER. BT X 4. TOLERATED BREAKFAST WELL. 1L
FLUID RESTRICTION. BRUCE PATENT, DRAINING TO GRAVITY. LABS DRAWN THIS AM, K,
MG AND PHOS BEING REPLACED, PLAN FOR REPEAT LABS AT 1700. PALLIATIVE CARE
CONSULTED. WILL CONTINUE TO MONITOR.

## 2022-03-15 NOTE — NUR
SHIFT SUMMARY:
PT A/O, DROWSY, SLEPT WELL OVERNIGHT.
NOREPI OFF AT 0035. MAPS >60. HR . DOBUTAMINE @ 2. HEPARIN INCREASED TO
7.
PT REFUSED BIPAP OVERNIGHT. 2L O2 NC W/ SLEEP. OCCASIONAL DIANA W/ DESATS TO LOW
80S.
POLYURIA OVERNIGHT. BRUCE DRAINING WELL.

## 2022-03-15 NOTE — NUR
SHIFT SUMMARY
NO ACUTE CHANGES THIS SHIFT. DOBUTAMINE AND LEVO REMAIN ON STANDBY. MAP>65. HR
100-120, AFLUTTER. REMAINED ON 2L VIA NC. LUNGS CLEAR. ART LINE IN PLACE, NO
CHANGES. ANTICIPATE D/C OF LINE IF PRESSURES REMAIN STABLE OVERNIGHT. PT
CONTINUES TO DENY COMPLAINTS. PICC TO RUE, DRESSING C/D/I. WILL CONTINUE TO
MONITOR UNTIL REPORT TO ONCOMING NURSE.

## 2022-03-15 NOTE — NUR
Brief supportive visit this AM. Pt resting in bed with his eyes closed. Pt
wakes to moderate verbal stimuli. Pt denies pain and dyspnea at this time.
Assessed Pt's understanding of current health. Pt states "something to do with
my breathing or heart". Brief gentle education given and discussed the
importance of routine conversations with MDs and specialist. Pt states "I hope
I can function again". Pt states he is weak and unable to ambulate very far
before needing to rest. Continued therapeutic listening. Ended visit to allow
Pt to rest.
 
Spoke with Primary RN Bonnie and discussed case.
 
Palliative Care will remain available.

## 2022-03-15 NOTE — NUR
DR MERLOS ROUNDS
DOBUTAMINE TITRATED TO 1 MCG/KG/MIN, PLAN TO PLACE ON STANDBY IN 1 HOUR. ART
LINE TO REMAIN IN TODAY, REASSESS NEED TOMORROW DAYSHIFT.

## 2022-03-16 LAB
ALBUMIN SERPL BCP-MCNC: 2 G/DL (ref 3.4–5)
ANION GAP SERPL CALCULATED.4IONS-SCNC: 7 MMOL/L (ref 6–16)
BASOPHILS # BLD AUTO: 0.02 K/MM3 (ref 0–0.23)
BASOPHILS NFR BLD AUTO: 0 % (ref 0–2)
BUN SERPL-MCNC: 52 MG/DL (ref 8–24)
CALCIUM SERPL-MCNC: 7.2 MG/DL (ref 8.5–10.1)
CHLORIDE SERPL-SCNC: 85 MMOL/L (ref 98–108)
CO2 SERPL-SCNC: 41 MMOL/L (ref 21–32)
CREAT SERPL-MCNC: 1.97 MG/DL (ref 0.6–1.2)
DEPRECATED RDW RBC AUTO: 52.2 FL (ref 35.1–46.3)
EOSINOPHIL # BLD AUTO: 0.09 K/MM3 (ref 0–0.68)
EOSINOPHIL NFR BLD AUTO: 1 % (ref 0–6)
ERYTHROCYTE [DISTWIDTH] IN BLOOD BY AUTOMATED COUNT: 19.6 % (ref 11.7–14.2)
GLUCOSE SERPL-MCNC: 138 MG/DL (ref 70–99)
HCT VFR BLD AUTO: 34.6 % (ref 37–53)
HGB BLD-MCNC: 11.5 G/DL (ref 13.5–17.5)
IMM GRANULOCYTES # BLD AUTO: 0.06 K/MM3 (ref 0–0.1)
IMM GRANULOCYTES NFR BLD AUTO: 1 % (ref 0–1)
LYMPHOCYTES # BLD AUTO: 1.16 K/MM3 (ref 0.84–5.2)
LYMPHOCYTES NFR BLD AUTO: 11 % (ref 21–46)
MAGNESIUM SERPL-MCNC: 1.6 MG/DL (ref 1.6–2.4)
MCHC RBC AUTO-ENTMCNC: 33.2 G/DL (ref 31.5–36.5)
MCV RBC AUTO: 74 FL (ref 80–100)
MONOCYTES # BLD AUTO: 1.06 K/MM3 (ref 0.16–1.47)
MONOCYTES NFR BLD AUTO: 10 % (ref 4–13)
NEUTROPHILS # BLD AUTO: 8.06 K/MM3 (ref 1.96–9.15)
NEUTROPHILS NFR BLD AUTO: 77 % (ref 41–73)
NRBC # BLD AUTO: 0 K/MM3 (ref 0–0.02)
NRBC BLD AUTO-RTO: 0 /100 WBC (ref 0–0.2)
PHOSPHATE SERPL-MCNC: 2.7 MG/DL (ref 2.5–4.9)
PLATELET # BLD AUTO: 135 K/MM3 (ref 150–400)
POTASSIUM SERPL-SCNC: 2.9 MMOL/L (ref 3.5–5.5)
SODIUM SERPL-SCNC: 133 MMOL/L (ref 136–145)

## 2022-03-16 NOTE — NUR
SHIFT SUMMARY:
HEPARIN INCREASED TO 9 OVERNIGHT
POTASSIUM= 2.9, MG= 1.6.
KCL 40 MEQ IV X1, KCL 20 MEQ PO QD, MAG SULFATE 500 MG IV X 1 ORDERED PER MD CABRAL.
NO OTHER CHANGES TO PT'S PRESENTATION.

## 2022-03-16 NOTE — NUR
TRANSFER TO PCU/REPORT TO CURRY RN
ART LINE REMOVED THIS SHIFT, MANUAL PRESSURE HELD, NO SWELLING, BRUISING OR
HEMATOMA NOTED. TOLERATED WELL. BRUCE REMOVED, 1500ML [place] URINE OUT THIS
SHIFT. CURRENTLY WORKING c PT. BP STABLE, -120'S, AFLUTTER. NO OTHER
ACUTE CHANGES THIS SHIFT. PT TO BE TRANSFERRED AFTER PT EVALUATION. ALL
BELONGINGS TO BE SENT c PT.

## 2022-03-16 NOTE — NUR
PT TRANSFERRED FROM ICU 1 REPORT RECEIVED FROM ARPITA RODRIGUEZ. PT ALERT AND
ORIENTED X3, CALLS APPROPRIATELY [facility] FOR TRANSFERS. DENIES ANY KIND OF PAIN
UPON ARRIVAL. ON 1L FLUID RESTRICTION, PT COMPLIANT. CALM AND COOPERATIVE. BP
SYSTOLIC REMAINS SOFT 80-90'S MAP KEPT ABOVE 60'S. NO COMPLAINTS AT THIS TIME,
WILL MONITOR TIL THE END OF SHIFT

## 2022-03-16 NOTE — NUR
ASSUMED CARE
REPORT FROM MAXIMINO RODRIGUEZ AT 0700. PT RESTING IN BED. WAKES c VERBAL STIMULI.
A&OX 3. DENIES COMPLAINTS, REPORTS GOOD SLEEP LAST NOC. AFLUTTER ON MONITOR,
RATE 110-120. BP STABLE. ART LINE TO RIGHT GROIN, DRESSING C/D/I. GOOD
WAVEFORM. LINE FLUSHED AND ZERO'D. EXT DISCOLORED, DUSKY, DELAYED CAP REFILL.
ABD ROUND, SOFT, NON TENDER. BT X 4. GOOD APPETITE. 1L FLUID RESTRICTION.
BRUCE PATENT, DRAINING CLEAR [place] URINE TO GRAVITY. PICC TO [facility], DRESSING TO
BE CHANGED THIS SHIFT. HEPARIN GTT CONTINUES AT 9 UNITS/KG/HR. WILL CONTINUE
TO MONITOR.

## 2022-03-17 LAB
ALBUMIN SERPL BCP-MCNC: 2 G/DL (ref 3.4–5)
ALBUMIN SERPL BCP-MCNC: 2 G/DL (ref 3.4–5)
ALBUMIN/GLOB SERPL: 0.5 {RATIO} (ref 0.8–1.8)
ALT SERPL W P-5'-P-CCNC: 416 U/L (ref 12–78)
ANION GAP SERPL CALCULATED.4IONS-SCNC: 4 MMOL/L (ref 6–16)
AST SERPL W P-5'-P-CCNC: 177 U/L (ref 12–37)
BILIRUB DIRECT SERPL-MCNC: 2.8 MG/DL (ref 0–0.3)
BILIRUB INDIRECT SERPL-MCNC: 0.7 MG/DL (ref 0.1–0.7)
BILIRUB SERPL-MCNC: 3.5 MG/DL (ref 0.1–1)
BUN SERPL-MCNC: 57 MG/DL (ref 8–24)
CALCIUM SERPL-MCNC: 7.5 MG/DL (ref 8.5–10.1)
CHLORIDE SERPL-SCNC: 88 MMOL/L (ref 98–108)
CO2 SERPL-SCNC: 42 MMOL/L (ref 21–32)
CREAT SERPL-MCNC: 1.84 MG/DL (ref 0.6–1.2)
GLOBULIN SER CALC-MCNC: 4 G/DL (ref 2.2–4)
GLUCOSE SERPL-MCNC: 212 MG/DL (ref 70–99)
HCT VFR BLD AUTO: 33 % (ref 37–53)
HGB BLD-MCNC: 10.4 G/DL (ref 13.5–17.5)
MAGNESIUM SERPL-MCNC: 1.6 MG/DL (ref 1.6–2.4)
PHOSPHATE SERPL-MCNC: 2.3 MG/DL (ref 2.5–4.9)
POTASSIUM SERPL-SCNC: 3.4 MMOL/L (ref 3.5–5.5)
PROT SERPL-MCNC: 6 G/DL (ref 6.4–8.2)
SODIUM SERPL-SCNC: 134 MMOL/L (ref 136–145)

## 2022-03-17 NOTE — NUR
SHIFT SUMMARY
PT ALERT AND ORIENTED X 4. HR A '-120. BP STABLE, MAP ABOVE 65. OXYGEN
SATURAITON MAINTAINED ABOVE 92% ON 2 L VIA NC WHILE SLEEPING. PT ABLE TO TURN
SELF IN BED. USES URINAL AT BEDSIDE. NO CP OR PRESSURE. CALL LIGHT WITHIN
REACH. WILL CONT TO MONITOR UNTIL REPORT GIVEN TO DAYSHIFT RN.

## 2022-03-17 NOTE — NUR
PT SUMMARY:
 
MO ACUTE CHANGE FOR THE SHIFT. NP REMAINS SOFT SYSTOLIC 'S, METOPROLOL
SWITCHED TO XL 100MG, -120'S, SATS KEPT ABOVE 92% ON RA, DESATS TO 87%
WHEN SLEEPING 2L OF O2 PRN AT BEDSIDE. DENIES ANY PAIN/DISCOMFORT FOR THE
SHIFT. RECEIVED A BED BATH TODAY. COMPLIANT WITH FLUID RESTRICTION, USES
URINAL FOR VOIDING. PT WORKED WITH PT TODAY WAS ABLE TO WALK AROUND THE ROOM.
NO ISSUES WITH APPETITE. PT ABLE TO MAKE NEEDS KNOWN, CALLS APPROPRIATELY WILL
REPORT TO ONCOMING SHIFT

## 2022-03-18 LAB
ANION GAP SERPL CALCULATED.4IONS-SCNC: 5 MMOL/L (ref 6–16)
BUN SERPL-MCNC: 51 MG/DL (ref 8–24)
CALCIUM SERPL-MCNC: 7.9 MG/DL (ref 8.5–10.1)
CHLORIDE SERPL-SCNC: 90 MMOL/L (ref 98–108)
CO2 SERPL-SCNC: 40 MMOL/L (ref 21–32)
CREAT SERPL-MCNC: 1.6 MG/DL (ref 0.6–1.2)
GLUCOSE SERPL-MCNC: 166 MG/DL (ref 70–99)
POTASSIUM SERPL-SCNC: 3.9 MMOL/L (ref 3.5–5.5)
SODIUM SERPL-SCNC: 135 MMOL/L (ref 136–145)

## 2022-03-18 NOTE — NUR
SHIFT SUMMARY
 
PT HAS BEEN RESTING IN BED FOR MOST OF THE DAY. PT WORKED WITH PHYSICAL
THERAPY AND TOLERATED THE ACTIVITY WELL. PT HAS BEEN ABLE TO SIT SELF UP TO
BEDSIDE TO DANGLE LEGS AD MACY. PT HAS DENIED C/O PAIN OR DISCOMFORT AND HAS
CALLED APPROPRIATELY FOR ASSISTANCE. PT EXPERIENCED NO EVENTS OF OXYGEN
SATURATION DECLINE AND HAS REMAINED ON ROOM AIR FOR THE DAY, SPO2 >92%. SBP
HAS REMAINED >100 AND HEART HAS CONSISTENTLY BEEN AROUND 120 DESPITE PO
MEDICATION PER EMAR. THERE HAVE BEEN NO OTHER CHANGES IN PT CONDITION.

## 2022-03-18 NOTE — NUR
SHIFT SUMMARY
 
PT ALERT AND ORIENTED X4. BP STABLE. ON RA SATS OVER 95%. HR AFLUTTER 120'S.
AFEBRILE. NO C/O DISCOMFORT OR PAIN.  R GROIN SITE C/D/I. 1L FLUID
RESTRICTION. VOIDS INDEPENDENTLY WITH BEDSIDE URINAL. IN BED SLEEPING WITH
CALL ALARM AT SIDE. WILL CONTINUE TO MONITOR UNTIL REPORT GIVEN TO TOÑO RODRIGUEZ.

## 2022-03-19 LAB
ALBUMIN SERPL BCP-MCNC: 2 G/DL (ref 3.4–5)
ANION GAP SERPL CALCULATED.4IONS-SCNC: 4 MMOL/L (ref 6–16)
BUN SERPL-MCNC: 43 MG/DL (ref 8–24)
CALCIUM SERPL-MCNC: 7.6 MG/DL (ref 8.5–10.1)
CHLORIDE SERPL-SCNC: 92 MMOL/L (ref 98–108)
CO2 SERPL-SCNC: 38 MMOL/L (ref 21–32)
CREAT SERPL-MCNC: 1.54 MG/DL (ref 0.6–1.2)
GLUCOSE SERPL-MCNC: 225 MG/DL (ref 70–99)
HCT VFR BLD AUTO: 33.2 % (ref 37–53)
HGB BLD-MCNC: 10.8 G/DL (ref 13.5–17.5)
MAGNESIUM SERPL-MCNC: 1.4 MG/DL (ref 1.6–2.4)
PHOSPHATE SERPL-MCNC: 1.9 MG/DL (ref 2.5–4.9)
POTASSIUM SERPL-SCNC: 3.7 MMOL/L (ref 3.5–5.5)
SODIUM SERPL-SCNC: 134 MMOL/L (ref 136–145)

## 2022-03-19 NOTE — NUR
SHIFT SUMMARY
 
PT HAS BEEN RESTING IN ROOM, PT WILL AMBULATE TO THE RESTROOM WHEN THEY NEED
TO RELIEVE THEMSELF AND HAS NOT CONSISTENTLY CALLED FOR ASSISTANCE. PT HAS
GIVEN VERY LITTLE VERBAL RESPONSE AND APPEARS WITHDRAWN. PT SLEPT FOR A FEW
HOURS AROUND MIDDAY. PT HAS DENIED C/O PAIN OR DISCOMFORT. SBP RANGED 105-113,
HEART RATE HAS MAINTAINED A CONSISTENT 120'S ATRIAL FLUTTER DESPITE PO MEDS.
THERE HAVE BEEN NO ACUTE CHANGES TO PT CONDITION.

## 2022-03-19 NOTE — NUR
SHIFT SUMMARY
 
PT ALERT AND ORIENTED X4. AFEBRILE. BP STABLE. ON RA SATS OVER 95%. NO C/O
PAIN OR DISCOMFORT. INDEPENDENT FOR ADL'S. 1L FLUID RESTRICTION. IN BED
RESTING WITH CALL ALARM AT SIDE. WILL CONTINUE TO MONITOR UNTIL REPORT GIVEN
TO DAYSHIFT RN

## 2022-03-20 LAB
ALBUMIN SERPL BCP-MCNC: 2 G/DL (ref 3.4–5)
ANION GAP SERPL CALCULATED.4IONS-SCNC: 4 MMOL/L (ref 6–16)
BUN SERPL-MCNC: 39 MG/DL (ref 8–24)
CALCIUM SERPL-MCNC: 8 MG/DL (ref 8.5–10.1)
CHLORIDE SERPL-SCNC: 95 MMOL/L (ref 98–108)
CO2 SERPL-SCNC: 34 MMOL/L (ref 21–32)
CREAT SERPL-MCNC: 1.32 MG/DL (ref 0.6–1.2)
GLUCOSE SERPL-MCNC: 172 MG/DL (ref 70–99)
HCT VFR BLD AUTO: 35.7 % (ref 37–53)
HGB BLD-MCNC: 11.2 G/DL (ref 13.5–17.5)
MAGNESIUM SERPL-MCNC: 1.6 MG/DL (ref 1.6–2.4)
PHOSPHATE SERPL-MCNC: 2.5 MG/DL (ref 2.5–4.9)
POTASSIUM SERPL-SCNC: 4.2 MMOL/L (ref 3.5–5.5)
SODIUM SERPL-SCNC: 133 MMOL/L (ref 136–145)

## 2022-03-20 NOTE — NUR
SHIFT SUMMARY
 
PT ALERT AND ORIENTED X4. AFEBRILE. HR AFLUTTER 110-120'S. BP STABLE. ON RA
SATS OVER 95%. INDEPENDENT FOR ADLS, ABLE TO AMBULATE TO BATHROOM AND BACK.
CALLS APPRORIATELY. IN BED SLEEPING WITH CALL ALARM AT SIDE. WILL CONTINUE TO
MONITOR UNTIL REPORT GIVEN TO DAYSHIFT RN

## 2022-03-20 NOTE — NUR
SHIFT SUMMARY
 
PT HAS BEEN RESTING IN ROOM, PT WILL GET UP AND AMBULATE SELF TO RESTROOM. PT
HAS HAD NO COMPLAINTS OF PAIN OR DISCOMFORT. PT APPEARS WITHDRAWN AND STATES
THAT THEY ARE "JUST READY TO GET OUT OF HERE." HEART RHYTHM AND RATE HAS
MAINTAINED ATRIAL FLUTTER  BPM, THIS AM RATE  FOR A SHORT TIME.
ALL OTHER VITAL SIGNS STABLE, NO CHANGES TO CURRENT CONDITION.

## 2022-03-21 LAB
ALBUMIN SERPL BCP-MCNC: 2.1 G/DL (ref 3.4–5)
ANION GAP SERPL CALCULATED.4IONS-SCNC: 5 MMOL/L (ref 6–16)
BUN SERPL-MCNC: 38 MG/DL (ref 8–24)
CALCIUM SERPL-MCNC: 8.3 MG/DL (ref 8.5–10.1)
CHLORIDE SERPL-SCNC: 97 MMOL/L (ref 98–108)
CO2 SERPL-SCNC: 32 MMOL/L (ref 21–32)
CREAT SERPL-MCNC: 1.28 MG/DL (ref 0.6–1.2)
GLUCOSE SERPL-MCNC: 149 MG/DL (ref 70–99)
HCT VFR BLD AUTO: 37.2 % (ref 37–53)
HGB BLD-MCNC: 11.7 G/DL (ref 13.5–17.5)
MAGNESIUM SERPL-MCNC: 1.7 MG/DL (ref 1.6–2.4)
PHOSPHATE SERPL-MCNC: 2.5 MG/DL (ref 2.5–4.9)
POTASSIUM SERPL-SCNC: 4.5 MMOL/L (ref 3.5–5.5)
SODIUM SERPL-SCNC: 134 MMOL/L (ref 136–145)

## 2022-03-21 NOTE — NUR
SHIFT SUMMMARY
 
ALERT AND ORIENTED X4. AFEBRILE. ON RA SATS OVER 97%. HR AFLUTTER 110-120'S.
BP STABLE. NO C/O DISCOMFORT, PAIN, OR SOB. REMAINS ON FLUID RESTRICTION. PICC
LINE DDRAWS.  INDEPENDENT FOR ADLS. FREQUENTLY HUNGRY. IN BED RESTING WITH
CALL ALARM AT SIDE. WILL CONTINUE TO MONITOR UNTIL REPORT GIVEN TO TOÑO RODRIGUEZ.

## 2022-03-21 NOTE — NUR
DISCHARGE:
 
NO ACUTE CHANGES. SEE PREVIOUS AM NOTE FOR UPDATES. VITALS REMAIN STABLE. HR
UP  WHEN UP WALKING AROUND, REMAINS ASYMPTOMATIC.  IN TO DISCUSS
DISCHARGE WITH PATIENT. DISCHARGE EDUCATION PROVIDED INCLUDING FOLLOW UP
APPOINTEMTS, MEDICATIONS, VITAL SIGNS AND PICKING UP MEDICATIONS FROM
PHARMACY. DISCUSSED IMPORTANCE OF FOLLOWING UP WITH DR. CABRAL, CARDIOLOGY AND
PCP. PATIENT STATES HE DID NOT HAVE PCP. WORKED WITH MAHSA  TO
CALL INSURANCE AND FIND PCP THROUGH [facility]. PATIENT TO CALL AND SCHEDULE
APPOINTMENTS. STATES WHEN HE GETS HOME HE WILL CALL AND SCHEDULE ALL THREE.
EACH MEDICATION REVIEWED. PATIENT AGREED TO GO TO PHARMACY ON WAY HOME TO PICK
UP NEW MEDICATIONS. VERBAL AS WELL AS WRITTEN EDUCATION PROVIDED ON
MEDICATIONS. PICC LINED REMOVED WNL. PATIENT ABLE TO TEACH BACK FOLLOW UP
APPOINTMENT, AND MEDICATIONS. LEFT UNIT VIA WHEELCHAIR WITH ALL PERSONAL
BELONGINGS.

## 2022-03-21 NOTE — NUR
AM NOTE:
 
PATIENT ALERT AND ORIENTED. VERY QUIET AND WITHDRAWN. DENIES
NUMBNESS/TINGLING. PERRLA. ABLE TO AMBULATE IND. ON ROOM AIR SATING MID 90'S.
DENIES SOB. TELE SHOWING AFLUTTER WITH -120'S. DENIES CHEST
PAIN/PRESSURE. BP STABLE. NO SIGNS OF EDEMA. DENIES ABDOMINAL PAIN/NAUSEA.
TOELRATING PO DIET. FLUID RESTRICTION OF 1000ML DAILY. PILLS WHOLE WITH
WATER. PICC LINE WNL, SALINE LOCKED. RIGHT IJ SITE WNL, DRESSING C/D/I. DENIES
NEEDS AT THIS TIME. BROTHER IN TO VISIT THIS AM. CALL LIGHT IN REACH. RESTING
AT THIS TIME. WILL CONTINUE TO MONITOR.

## 2022-04-05 ENCOUNTER — HOSPITAL ENCOUNTER (OUTPATIENT)
Dept: HOSPITAL 95 - MHTC | Age: 57
Discharge: HOME | End: 2022-04-05
Attending: INTERNAL MEDICINE
Payer: COMMERCIAL

## 2022-04-05 VITALS — WEIGHT: 183.42 LBS | BODY MASS INDEX: 23.54 KG/M2 | HEIGHT: 74 IN

## 2022-04-05 DIAGNOSIS — I48.92: Primary | ICD-10-CM

## 2022-04-05 PROCEDURE — A9270 NON-COVERED ITEM OR SERVICE: HCPCS

## 2022-04-05 NOTE — NUR
0935 PATIENT BROUGHT TO THE PROCEDURE ROOM AND PREPPED FOR THE CARDIOVERSION.
HISTORY OBTAINED. EKG PERFROMED. PLACED ON THE MONITOR AND PIV STARTED.
PATIENT HAS BEEN NPO. MEDICATION RECONCILIATION PERFROMED. VVS. NO PAIN AT
THIS TIME.

## 2022-04-05 NOTE — NUR
PATIENT UP AND DRESSED AND GIVEN HIS DOSE OF AMIODARONE 200 MG ORAL FOR HIS
MISSED DOSE THIS MORNING. REVIEWED HIS DISCHARGE INSTRUCTIONS AND GAVE COPIES
OF INSTRUCTIONS. PATIENT WAS WHEELCHAIRED TO THE [facility] ENTRANCE TO CATCH THE
[employer] SERVICE.

## 2022-04-05 NOTE — NUR
PATIETN IS AWAKE ADN NO PAIN NOTED FROM THE PATIENT. SINUS NOTED ON EMERITA
MONITOR. PIV DISCONTINUED FROMT HE LEFT AC. CATH TIP INTACT. PRESSURE
DRRESSING APPLIED TO THE LEFT AC SITE. NO BLEEDING NOTED.